# Patient Record
Sex: FEMALE | Race: WHITE | NOT HISPANIC OR LATINO | Employment: OTHER | URBAN - METROPOLITAN AREA
[De-identification: names, ages, dates, MRNs, and addresses within clinical notes are randomized per-mention and may not be internally consistent; named-entity substitution may affect disease eponyms.]

---

## 2020-09-15 ENCOUNTER — HOSPITAL ENCOUNTER (EMERGENCY)
Facility: CLINIC | Age: 60
Discharge: HOME | End: 2020-09-16
Attending: EMERGENCY MEDICINE | Admitting: EMERGENCY MEDICINE
Payer: COMMERCIAL

## 2020-09-15 DIAGNOSIS — G51.0 BELL'S PALSY: ICD-10-CM

## 2020-09-15 PROCEDURE — 85025 COMPLETE CBC W/AUTO DIFF WBC: CPT | Performed by: EMERGENCY MEDICINE

## 2020-09-15 PROCEDURE — 99283 EMERGENCY DEPT VISIT LOW MDM: CPT

## 2020-09-15 PROCEDURE — 80048 BASIC METABOLIC PNL TOTAL CA: CPT | Performed by: EMERGENCY MEDICINE

## 2020-09-15 NOTE — ED AVS SNAPSHOT
[facility] Emergency Department  [address]  [facility] [address]  Phone:  [phone]  Fax:  [phone]                                    Nina Shepard   MRN: [id number]    Department:  [facility] Emergency Department   Date of Visit:  9/15/2020           After Visit Summary Signature Page    I have received my discharge instructions, and my questions have been answered. I have discussed any challenges I see with this plan with the nurse or doctor.    ..........................................................................................................................................  Patient/Patient Representative Signature      ..........................................................................................................................................  Patient Representative Print Name and Relationship to Patient    ..................................................               ................................................  Date                                   Time    ..........................................................................................................................................  Reviewed by Signature/Title    ...................................................              ..............................................  Date                                               Time          22EPIC Rev 08/18

## 2020-09-16 VITALS
OXYGEN SATURATION: 96 % | HEART RATE: 75 BPM | RESPIRATION RATE: 16 BRPM | SYSTOLIC BLOOD PRESSURE: 120 MMHG | DIASTOLIC BLOOD PRESSURE: 70 MMHG | TEMPERATURE: 98.1 F

## 2020-09-16 LAB
ANION GAP SERPL CALCULATED.3IONS-SCNC: 9 MMOL/L (ref 3–14)
BASOPHILS # BLD AUTO: 0 10E9/L (ref 0–0.2)
BASOPHILS NFR BLD AUTO: 0.6 %
BUN SERPL-MCNC: 18 MG/DL (ref 7–30)
CALCIUM SERPL-MCNC: 8.9 MG/DL (ref 8.5–10.1)
CHLORIDE SERPL-SCNC: 108 MMOL/L (ref 94–109)
CO2 SERPL-SCNC: 20 MMOL/L (ref 20–32)
CREAT SERPL-MCNC: 0.7 MG/DL (ref 0.52–1.04)
DIFFERENTIAL METHOD BLD: ABNORMAL
EOSINOPHIL # BLD AUTO: 0.2 10E9/L (ref 0–0.7)
EOSINOPHIL NFR BLD AUTO: 3.2 %
ERYTHROCYTE [DISTWIDTH] IN BLOOD BY AUTOMATED COUNT: 12.6 % (ref 10–15)
GFR SERPL CREATININE-BSD FRML MDRD: >90 ML/MIN/{1.73_M2}
GLUCOSE SERPL-MCNC: 163 MG/DL (ref 70–99)
HCT VFR BLD AUTO: 43.7 % (ref 35–47)
HGB BLD-MCNC: 14.6 G/DL (ref 11.7–15.7)
IMM GRANULOCYTES # BLD: 0 10E9/L (ref 0–0.4)
IMM GRANULOCYTES NFR BLD: 0.3 %
LYMPHOCYTES # BLD AUTO: 1.5 10E9/L (ref 0.8–5.3)
LYMPHOCYTES NFR BLD AUTO: 24.8 %
MCH RBC QN AUTO: 31.1 PG (ref 26.5–33)
MCHC RBC AUTO-ENTMCNC: 33.4 G/DL (ref 31.5–36.5)
MCV RBC AUTO: 93 FL (ref 78–100)
MONOCYTES # BLD AUTO: 0.6 10E9/L (ref 0–1.3)
MONOCYTES NFR BLD AUTO: 9 %
NEUTROPHILS # BLD AUTO: 3.9 10E9/L (ref 1.6–8.3)
NEUTROPHILS NFR BLD AUTO: 62.1 %
NRBC # BLD AUTO: 0 10*3/UL
NRBC BLD AUTO-RTO: 0 /100
PLATELET # BLD AUTO: 143 10E9/L (ref 150–450)
POTASSIUM SERPL-SCNC: 3.9 MMOL/L (ref 3.4–5.3)
RBC # BLD AUTO: 4.69 10E12/L (ref 3.8–5.2)
SODIUM SERPL-SCNC: 137 MMOL/L (ref 133–144)
WBC # BLD AUTO: 6.2 10E9/L (ref 4–11)

## 2020-09-16 PROCEDURE — 25000131 ZZH RX MED GY IP 250 OP 636 PS 637: Performed by: EMERGENCY MEDICINE

## 2020-09-16 PROCEDURE — 25000132 ZZH RX MED GY IP 250 OP 250 PS 637: Performed by: EMERGENCY MEDICINE

## 2020-09-16 RX ORDER — VALACYCLOVIR HYDROCHLORIDE 1 G/1
1000 TABLET, FILM COATED ORAL ONCE
Status: COMPLETED | OUTPATIENT
Start: 2020-09-16 | End: 2020-09-16

## 2020-09-16 RX ORDER — PREDNISONE 20 MG/1
60 TABLET ORAL ONCE
Status: COMPLETED | OUTPATIENT
Start: 2020-09-16 | End: 2020-09-16

## 2020-09-16 RX ORDER — POLYVINYL ALCOHOL 14 MG/ML
1 SOLUTION/ DROPS OPHTHALMIC
Qty: 15 ML | Refills: 0 | Status: SHIPPED | OUTPATIENT
Start: 2020-09-16 | End: 2020-09-23

## 2020-09-16 RX ORDER — PREDNISONE 20 MG/1
60 TABLET ORAL DAILY
Qty: 18 TABLET | Refills: 0 | Status: SHIPPED | OUTPATIENT
Start: 2020-09-16 | End: 2020-09-22

## 2020-09-16 RX ORDER — VALACYCLOVIR HYDROCHLORIDE 1 G/1
1000 TABLET, FILM COATED ORAL 3 TIMES DAILY
Qty: 21 TABLET | Refills: 0 | Status: SHIPPED | OUTPATIENT
Start: 2020-09-16 | End: 2020-09-23

## 2020-09-16 RX ADMIN — VALACYCLOVIR HYDROCHLORIDE 1000 MG: 1 TABLET, FILM COATED ORAL at 00:26

## 2020-09-16 RX ADMIN — PREDNISONE 60 MG: 20 TABLET ORAL at 00:26

## 2020-09-16 ASSESSMENT — ENCOUNTER SYMPTOMS
HEADACHES: 0
ABDOMINAL PAIN: 0
CONFUSION: 0
FEVER: 0
SHORTNESS OF BREATH: 0
FACIAL ASYMMETRY: 1
COUGH: 0
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
## 2022-06-30 DIAGNOSIS — R69 DIAGNOSIS UNKNOWN: Primary | ICD-10-CM

## 2022-08-08 NOTE — TELEPHONE ENCOUNTER
Diagnosis: CIRRHOSIS OF THE LIVER  Referred by: Self   From: [facility]   Appt date: 08/09/2022   NOTES STATUS DETAILS   OFFICE NOTE from referring provider CareEverywhere  06/28/2022: Todd Michaud     OFFICE NOTE from other specialist CareEverywhere  04/19/2022: Todd Mendoza   DISCHARGE SUMMARY from hospital N/A     DISCHARGE REPORT from the ER N/A    OPERATIVE REPORT N/A     MEDICATION LIST Internal  N/A   LABS     C. DIFFICILE  N/A    BIOPSIES/PATHOLOGY RELATED TO DIAGNOSIS N/A     DIAGNOSTIC PROCEDURES     PFC TESTING (from the Pelvic floor center includes Manometry, PDNL, EMG, etc.) N/A    COLONOSCOPY N/A     UPPER ENDOSCOPY (EGD) N/A    FLEX SIGMOIDOSCOPY N/A     ERCP N/A    IMAGING (DISC & REPORT)      CT CareEverywhere 08/04/2020: [facility] (Abd/Pelvis)   MRI N/A    XRAY N/A    ULTRASOUND  (ENDOANAL/ENDORECTAL) CareEverywhere 06/07/2022: [facility] (Abd)

## 2022-08-09 ENCOUNTER — LAB (OUTPATIENT)
Dept: LAB | Facility: CLINIC | Age: 62
End: 2022-08-09
Payer: COMMERCIAL

## 2022-08-09 ENCOUNTER — PRE VISIT (OUTPATIENT)
Dept: GASTROENTEROLOGY | Facility: CLINIC | Age: 62
End: 2022-08-09

## 2022-08-09 ENCOUNTER — OFFICE VISIT (OUTPATIENT)
Dept: GASTROENTEROLOGY | Facility: CLINIC | Age: 62
End: 2022-08-09
Payer: COMMERCIAL

## 2022-08-09 ENCOUNTER — TELEPHONE (OUTPATIENT)
Dept: GASTROENTEROLOGY | Facility: CLINIC | Age: 62
End: 2022-08-09

## 2022-08-09 VITALS
OXYGEN SATURATION: 99 % | WEIGHT: 158 LBS | HEART RATE: 69 BPM | DIASTOLIC BLOOD PRESSURE: 81 MMHG | BODY MASS INDEX: 26.29 KG/M2 | SYSTOLIC BLOOD PRESSURE: 147 MMHG

## 2022-08-09 DIAGNOSIS — K74.60 CIRRHOSIS OF LIVER WITHOUT ASCITES, UNSPECIFIED HEPATIC CIRRHOSIS TYPE (H): ICD-10-CM

## 2022-08-09 DIAGNOSIS — K74.60 CIRRHOSIS OF LIVER WITHOUT ASCITES, UNSPECIFIED HEPATIC CIRRHOSIS TYPE (H): Primary | ICD-10-CM

## 2022-08-09 DIAGNOSIS — R69 DIAGNOSIS UNKNOWN: ICD-10-CM

## 2022-08-09 DIAGNOSIS — E11.69 TYPE 2 DIABETES MELLITUS WITH OTHER SPECIFIED COMPLICATION, WITH LONG-TERM CURRENT USE OF INSULIN (H): ICD-10-CM

## 2022-08-09 DIAGNOSIS — Z79.4 TYPE 2 DIABETES MELLITUS WITH OTHER SPECIFIED COMPLICATION, WITH LONG-TERM CURRENT USE OF INSULIN (H): ICD-10-CM

## 2022-08-09 DIAGNOSIS — Z12.11 COLON CANCER SCREENING: ICD-10-CM

## 2022-08-09 DIAGNOSIS — K42.9 PERIUMBILICAL HERNIA: ICD-10-CM

## 2022-08-09 DIAGNOSIS — Z12.11 COLON CANCER SCREENING: Primary | ICD-10-CM

## 2022-08-09 PROBLEM — G51.0: Status: ACTIVE | Noted: 2020-09-25

## 2022-08-09 LAB
ERYTHROCYTE [DISTWIDTH] IN BLOOD BY AUTOMATED COUNT: 13 % (ref 10–15)
HBA1C MFR BLD: 7.4 % (ref 0–5.6)
HCT VFR BLD AUTO: 40.5 % (ref 35–47)
HGB BLD-MCNC: 13.8 G/DL (ref 11.7–15.7)
MCH RBC QN AUTO: 31.9 PG (ref 26.5–33)
MCHC RBC AUTO-ENTMCNC: 34.1 G/DL (ref 31.5–36.5)
MCV RBC AUTO: 94 FL (ref 78–100)
PLATELET # BLD AUTO: 100 10E3/UL (ref 150–450)
RBC # BLD AUTO: 4.32 10E6/UL (ref 3.8–5.2)
WBC # BLD AUTO: 4.2 10E3/UL (ref 4–11)

## 2022-08-09 PROCEDURE — 86256 FLUORESCENT ANTIBODY TITER: CPT | Mod: 90

## 2022-08-09 PROCEDURE — 86015 ACTIN ANTIBODY EACH: CPT | Mod: 90

## 2022-08-09 PROCEDURE — 83036 HEMOGLOBIN GLYCOSYLATED A1C: CPT

## 2022-08-09 PROCEDURE — 80321 ALCOHOLS BIOMARKERS 1OR 2: CPT | Mod: 90

## 2022-08-09 PROCEDURE — 36415 COLL VENOUS BLD VENIPUNCTURE: CPT

## 2022-08-09 PROCEDURE — 80053 COMPREHEN METABOLIC PANEL: CPT

## 2022-08-09 PROCEDURE — 85027 COMPLETE CBC AUTOMATED: CPT

## 2022-08-09 PROCEDURE — 99205 OFFICE O/P NEW HI 60 MIN: CPT | Performed by: INTERNAL MEDICINE

## 2022-08-09 PROCEDURE — 99000 SPECIMEN HANDLING OFFICE-LAB: CPT

## 2022-08-09 PROCEDURE — 85610 PROTHROMBIN TIME: CPT

## 2022-08-09 PROCEDURE — 86381 MITOCHONDRIAL ANTIBODY EACH: CPT

## 2022-08-09 RX ORDER — CANAGLIFLOZIN 300 MG/1
300 TABLET, FILM COATED ORAL DAILY
COMMUNITY
Start: 2021-10-13

## 2022-08-09 RX ORDER — VALSARTAN 40 MG/1
1 TABLET ORAL DAILY
COMMUNITY
Start: 2022-06-15

## 2022-08-09 RX ORDER — BISACODYL 5 MG/1
TABLET, DELAYED RELEASE ORAL
Qty: 4 TABLET | Refills: 0 | Status: SHIPPED | OUTPATIENT
Start: 2022-08-09

## 2022-08-09 RX ORDER — CARBOXYMETHYLCELLULOSE SODIUM 5 MG/ML
1 SOLUTION/ DROPS OPHTHALMIC
COMMUNITY
Start: 2020-09-25

## 2022-08-09 RX ORDER — AMMONIUM LACTATE 12 G/100G
LOTION TOPICAL
COMMUNITY

## 2022-08-09 RX ORDER — ZOLPIDEM TARTRATE 5 MG/1
5 TABLET ORAL
COMMUNITY
Start: 2022-03-23

## 2022-08-09 RX ORDER — CYCLOBENZAPRINE HCL 5 MG
5 TABLET ORAL
COMMUNITY
Start: 2021-03-01

## 2022-08-09 RX ORDER — LEVOTHYROXINE SODIUM 100 UG/1
100 TABLET ORAL
COMMUNITY
Start: 2020-11-09

## 2022-08-09 RX ORDER — ONDANSETRON 4 MG/1
4 TABLET, FILM COATED ORAL
COMMUNITY

## 2022-08-09 RX ORDER — INSULIN GLARGINE 100 [IU]/ML
70 INJECTION, SOLUTION SUBCUTANEOUS
COMMUNITY
Start: 2022-07-19

## 2022-08-09 RX ORDER — METOPROLOL SUCCINATE 25 MG/1
1 TABLET, EXTENDED RELEASE ORAL DAILY
COMMUNITY
Start: 2022-03-24

## 2022-08-09 RX ORDER — CYCLOBENZAPRINE HCL 10 MG
10 TABLET ORAL
COMMUNITY

## 2022-08-09 RX ORDER — ATORVASTATIN CALCIUM 20 MG/1
20 TABLET, FILM COATED ORAL DAILY
COMMUNITY
Start: 2022-06-14

## 2022-08-09 NOTE — TELEPHONE ENCOUNTER
Patient scheduled for colonoscopy/EGD on 8/17/22.     Covid test scheduled? No. Discuss at home test option.     Pre op exam scheduled?    Arrival time: 0830    Facility location:     Sedation type: MAC    Indication for procedure: cirrhosis of liver, screening     Anticoagulations? no     Bowel prep recommendation: Golytely d/t DM    Golytely  prep sent to [facility] [address] [facility] [address] [place] [facility].    Writer unable to see endoscopy scheduling appointment encounter. Does not appear that prep instructions were given via letter. Will clarify with endoscopy scheduling.     Patient seen Dr. Carranza today in clinic (8/9/2022). Will clarify if this can be used as pre op or if patient needs to scheduled.     Rose Robb RN

## 2022-08-09 NOTE — PROGRESS NOTES
"[facility]       Hepatology Clinic    Date of Service: 8/9/2022       Primary Care Provider: Dr. Mendoza    History of Present Illness     Ms. Shepard presents for evaluation of apparent WARREN cirrhosis.    This patient has been followed in the [facility] system, and sees Dr. Mendoza for her primary care.  She recently underwent an ultrasound to evaluate an abdominal hernia, and this showed evidence of fatty liver, hepatomegaly, and possible cirrhosis.  A subsequent liver biopsy at [facility] showed WARREN and \"nearly complete cirrhosis.\"    The patient reports no prior knowledge of liver disease.  She reports no history of alcohol use.  There is no family history of liver disease.  She has noted chronic abdominal adiposity for a number of years, but exercises regularly and eats a \"healthy\" diet, and does not show evidence of obesity elsewhere in the body.    She has type 2 diabetes and is on Canagliflozin as well as high high doses of insulin.    She has a history of breast cancer that apparently is in remission.  She also had coronary artery stents placed in about 2007.  She reports no current cardiac symptoms.    Has intermittent pain that she localizes to her right flank.  This is sometimes positional in nature.  She also has joint symptoms that have been attributed to psoriatic arthritis.  She has a history of anxiety and depression.    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      Past Medical History:  No past medical history on file.    Patient Active Problem List   Diagnosis     Bell's paralysis     Cirrhosis of liver without ascites (H)     Coronary atherosclerosis     Essential hypertension     Generalized anxiety disorder     Hypothyroidism     Major depressive disorder, recurrent episode, in partial remission (H)     Mixed hyperlipidemia     Psoriatic arthropathy (H)     Type II diabetes mellitus (H)       Surgical " History:  Past Surgical History:   Procedure Laterality Date     APPENDECTOMY       HYSTERECTOMY       MAMMOPLASTY AUGMENTATION Bilateral 12/30/2016    Procedure: BILATERAL BREAST IMPLANT EXCHANGE;  Surgeon: Ermias Barakat MD;  Location: [facility];  Service:      MASTECTOMY Bilateral        Social History:  Social History     Tobacco Use     Smoking status: Never Smoker     Smokeless tobacco: Never Used   Substance Use Topics     Alcohol use: No     Drug use: No       Family History:  No family history on file.   There is no family history of liver disease or colon cancer.    Medications:  Current Outpatient Medications   Medication     calcium carbonate-vitamin D (OYSTER SHELL CALCIUM/D) 500-200 MG-UNIT tablet     carboxymethylcellulose (REFRESH PLUS) 0.5 % SOLN ophthalmic solution     cyclobenzaprine (FLEXERIL) 5 MG tablet     insulin aspart (NOVOLOG PEN) 100 UNIT/ML pen     insulin glargine (LANTUS VIAL) 100 UNIT/ML vial     levothyroxine (SYNTHROID/LEVOTHROID) 100 MCG tablet     metoprolol succinate ER (TOPROL XL) 25 MG 24 hr tablet     polyethylene glycol-propylene glycol (SYSTANE ULTRA) 0.4-0.3 % SOLN ophthalmic solution     valsartan (DIOVAN) 40 MG tablet     zolpidem (AMBIEN) 5 MG tablet     ammonium lactate (LAC-HYDRIN) 12 % external lotion     Apremilast (OTEZLA) 10 & 20 & 30 MG TBPK     artificial tears (SOOTHE NIGHT TIME) ophthalmic ointment     atorvastatin (LIPITOR) 20 MG tablet     conjugated estrogens (PREMARIN) 0.625 MG/GM vaginal cream     cyclobenzaprine (FLEXERIL) 10 MG tablet     hypromellose (GENTEAL) 0.3 % SOLN ophthalmic solution     INVOKANA 300 MG tablet     ondansetron (ZOFRAN) 4 MG tablet     valACYclovir (VALTREX) 1000 mg tablet     No current facility-administered medications for this visit.       Review of Systems    A complete 10 point review of systems was asked and answered in the negative unless specifically commented upon in the HPI    Objective:         Vitals:     08/09/22 1306   BP: (!) 147/81   Pulse: 69   SpO2: 99%   Weight: 71.7 kg (158 lb)     Body mass index is 26.29 kg/m .     Physical Exam  Constitutional: Well-developed, well-nourished.  She initially was quite anxious.  HEENT: Normocephalic.  No scleral icterus. Moist oral mucosa.  Cardiac:  Regular rate and rhythm.  No overt murmurs  Respiratory: Clear to auscultation bilaterally.  No wheezes or rales  GI:  Abdomen soft, non-distended, non-tender. BS present.  Her liver is palpable in the right upper quadrant.  Skin:  No rash noted.  No jaundice.  No spider nevi noted.    Peripheral Vascular: No lower extremity edema.   Musculoskeletal:  ROM intact, good muscle bulk    Psychiatric: Normal mood and affect. Behavior is normal.  Neuro: No asterixis    Labs and Diagnostic tests:  Recent lab tests from [facility] were reviewed.    HCV antibody negative  Hep B surface antigen, surface antibody, and core antibody negative  HIV negative  Mitochondrial antibody positive at 108.8  Smooth muscle antibody positive at 61  ISAÍAS positive at greater than 1:640  Alpha-1 antitrypsin phenotype M1 M2  Ceruloplasmin 23  Normal iron studies  IgG 1296     US 6-7-22  IMPRESSION:   1.  Hepatosplenomegaly with coarsened hepatic echotexture suggesting chronic hepatocellular disease with possible portal hypertension. Main portal vein is patent with flow directed towards the liver.     2.  Focused ultrasound in the epigastric region at the site of patient's palpable lump appears to represent a fat-containing periumbilical hernia. No discrete or suspicious mass/fluid collection. No vascular structure to suggest periumbilical varices.      Assessment and Plan:    1.  Likely WARREN cirrhosis (compensated).  2.  Apparently increased intra-abdominal fat without overt adiposity elsewhere.  3.  Diabetes with high insulin requirements.  4.  Positive AMA and smooth muscle antibody.  5. Umbilical hernia.  6.  Psoriatic arthritis.    The biopsy findings  suggest that WARREN is the etiology of her advanced liver disease.  However, she presents an interesting phenotype with apparently increased intra-abdominal fat and apparent insulin resistance.  This might represent lipodystrophy or other distinct disorder.     I think that she has had an appropriate work-up for other forms of liver disease. I would like to get Dr. Rosenthal to consult on the liver biopsy because of the serologic markers (ISAÍAS, SMA, AMA).    I am also recommending that we arrange for an Endocrinology consultation to consider whether any other conditions (e.g. a lipodystrophy syndrome) might be contributing to hear WARREN and apparent insulin resistance.     I have ordered an EGD to screen for varices. I have also ordered a CT scan to better evaluate her right flank pain and portal HTN.    Given her cirrhosis, I think it is prudent to have her umbilical hernia repaired, since this would present a problem if she develops ascites. We will initiate a surgery consult.    She wondered whether she can restart Apremilast for her psoriatic arthritis. I see no hepatic contraindication to this.        Follow Up:  6 months, or sooner depending on her results.    About 70 minutes spent today with patient, reviewing results, and coordinating care.    Addendum 8-10-22: I have communicated with Ms. Huerta - the patient will be seen in the weight management clinic specializing in fatty liver disease, and will coordinate endocrinology and surgery consultations.    Rogerio Carranza MD  Professor of Medicine  [facility]  Division of Gastroenterology, Hepatology, and Nutrition

## 2022-08-09 NOTE — PATIENT INSTRUCTIONS
It was a pleasure taking care of you today. I've included a brief summary of our discussion and care plan from today's visit below.  Please review this information with your primary care provider.  _______________________________________________________________________    My recommendations are summarized as follows:  Blood tests today.  Abdominal CT scan at the [facility] - you can call the number below to schedule.  Endoscopy and Colonoscopy as discussed.  We will arrange for a consultation in the weight management - fatty liver clinic at the [facility].  We will arrange to review your liver biopsy.    Return to GI Clinic in about 6 months to review your progress.     If you need any follow-up appointments, please use the following phone numbers below.    To schedule or reschedule a follow-up GI appointment, call [phone] option 1    To schedule your endoscopy procedure, call [phone] option 2    To schedule imaging, please call [phone]     To schedule your lab appointment at [address] floor lab call [phone]. Call your [place] lab directly if it is not [facility]. If you use a non-[place] lab, please let us know where to fax your lab order (call Gwen at [phone]).      _______________________________________________________________________    Please be in touch if there are any further questions that arise following today's visit.  There are multiple ways to contact your gastroenterology care team.      During business hours, you may reach your gastroenterology RN Care Coordinator, Gwen Palacios, at [phone].      You can always send a secure message through [employer]. [employer] messages are answered by your nurse or doctor typically within 24 hours. Please allow extra time on weekends and holidays.     What is [employer]?  [employer] is a secure way for you to access all of your healthcare records from the [facility].  It is a web based computer program, so you  can sign on to it from any location.  It also allows you to send secure messages to your care team.  I recommend signing up for [employer] access if you have not already done so and are comfortable with using a computer.     For urgent/emergent questions after business hours, you may reach the on-call GI Fellow by contacting the [facility]  at [phone].     How will I get the results of any tests ordered?    You will receive all of your results.  If you have signed up for [employer], any tests ordered at your visit will be available to you after your physician reviews them.  Typically this takes 1-2 weeks.  If there are urgent results that require a change in your care plan, your physician or nurse will call you to discuss the next steps.      Thank you for choosing [facility] G[facility]ology [facility]!       Sincerely,    Rogerio Carranza MD  Professor of Medicine  [facility]  Division of Gastroenterology, Hepatology, and Nutrition

## 2022-08-09 NOTE — LETTER
"    8/9/2022         RE: Nina Shepard  [address] [place] [address]        Dear Colleague,    Thank you for referring your patient, Nina Shepard, to the [facility] [place]. Please see a copy of my visit note below.    [facility]       Hepatology Clinic    Date of Service: 8/9/2022       Primary Care Provider: Dr. Mendoza    History of Present Illness     Ms. Shepard presents for evaluation of apparent WARREN cirrhosis.    This patient has been followed in the [facility] system, and sees Dr. Mendoza for her primary care.  She recently underwent an ultrasound to evaluate an abdominal hernia, and this showed evidence of fatty liver, hepatomegaly, and possible cirrhosis.  A subsequent liver biopsy at [facility] showed WARREN and \"nearly complete cirrhosis.\"    The patient reports no prior knowledge of liver disease.  She reports no history of alcohol use.  There is no family history of liver disease.  She has noted chronic abdominal adiposity for a number of years, but exercises regularly and eats a \"healthy\" diet, and does not show evidence of obesity elsewhere in the body.    She has type 2 diabetes and is on Canagliflozin as well as high high doses of insulin.    She has a history of breast cancer that apparently is in remission.  She also had coronary artery stents placed in about 2007.  She reports no current cardiac symptoms.    Has intermittent pain that she localizes to her right flank.  This is sometimes positional in nature.  She also has joint symptoms that have been attributed to psoriatic arthritis.  She has a history of anxiety and depression.    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      Past Medical History:  No past medical history on file.    Patient Active Problem List   Diagnosis     Bell's paralysis     Cirrhosis of liver without ascites (H)     Coronary atherosclerosis     " Essential hypertension     Generalized anxiety disorder     Hypothyroidism     Major depressive disorder, recurrent episode, in partial remission (H)     Mixed hyperlipidemia     Psoriatic arthropathy (H)     Type II diabetes mellitus (H)       Surgical History:  Past Surgical History:   Procedure Laterality Date     APPENDECTOMY       HYSTERECTOMY       MAMMOPLASTY AUGMENTATION Bilateral 12/30/2016    Procedure: BILATERAL BREAST IMPLANT EXCHANGE;  Surgeon: Ermias Barakat MD;  Location: [facility] OR;  Service:      MASTECTOMY Bilateral        Social History:  Social History     Tobacco Use     Smoking status: Never Smoker     Smokeless tobacco: Never Used   Substance Use Topics     Alcohol use: No     Drug use: No       Family History:  No family history on file.   There is no family history of liver disease or colon cancer.    Medications:  Current Outpatient Medications   Medication     calcium carbonate-vitamin D (OYSTER SHELL CALCIUM/D) 500-200 MG-UNIT tablet     carboxymethylcellulose (REFRESH PLUS) 0.5 % SOLN ophthalmic solution     cyclobenzaprine (FLEXERIL) 5 MG tablet     insulin aspart (NOVOLOG PEN) 100 UNIT/ML pen     insulin glargine (LANTUS VIAL) 100 UNIT/ML vial     levothyroxine (SYNTHROID/LEVOTHROID) 100 MCG tablet     metoprolol succinate ER (TOPROL XL) 25 MG 24 hr tablet     polyethylene glycol-propylene glycol (SYSTANE ULTRA) 0.4-0.3 % SOLN ophthalmic solution     valsartan (DIOVAN) 40 MG tablet     zolpidem (AMBIEN) 5 MG tablet     ammonium lactate (LAC-HYDRIN) 12 % external lotion     Apremilast (OTEZLA) 10 & 20 & 30 MG TBPK     artificial tears (SOOTHE NIGHT TIME) ophthalmic ointment     atorvastatin (LIPITOR) 20 MG tablet     conjugated estrogens (PREMARIN) 0.625 MG/GM vaginal cream     cyclobenzaprine (FLEXERIL) 10 MG tablet     hypromellose (GENTEAL) 0.3 % SOLN ophthalmic solution     INVOKANA 300 MG tablet     ondansetron (ZOFRAN) 4 MG tablet     valACYclovir (VALTREX) 1000 mg tablet      No current facility-administered medications for this visit.       Review of Systems    A complete 10 point review of systems was asked and answered in the negative unless specifically commented upon in the HPI    Objective:         Vitals:    08/09/22 1306   BP: (!) 147/81   Pulse: 69   SpO2: 99%   Weight: 71.7 kg (158 lb)     Body mass index is 26.29 kg/m .     Physical Exam  Constitutional: Well-developed, well-nourished.  She initially was quite anxious.  HEENT: Normocephalic.  No scleral icterus. Moist oral mucosa.  Cardiac:  Regular rate and rhythm.  No overt murmurs  Respiratory: Clear to auscultation bilaterally.  No wheezes or rales  GI:  Abdomen soft, non-distended, non-tender. BS present.  Her liver is palpable in the right upper quadrant.  Skin:  No rash noted.  No jaundice.  No spider nevi noted.    Peripheral Vascular: No lower extremity edema.   Musculoskeletal:  ROM intact, good muscle bulk    Psychiatric: Normal mood and affect. Behavior is normal.  Neuro: No asterixis    Labs and Diagnostic tests:  Recent lab tests from [facility] were reviewed.    HCV antibody negative  Hep B surface antigen, surface antibody, and core antibody negative  HIV negative  Mitochondrial antibody positive at 108.8  Smooth muscle antibody positive at 61  ISAÍAS positive at greater than 1:640  Alpha-1 antitrypsin phenotype M1 M2  Ceruloplasmin 23  Normal iron studies  IgG 1296     US 6-7-22  IMPRESSION:   1.  Hepatosplenomegaly with coarsened hepatic echotexture suggesting chronic hepatocellular disease with possible portal hypertension. Main portal vein is patent with flow directed towards the liver.     2.  Focused ultrasound in the epigastric region at the site of patient's palpable lump appears to represent a fat-containing periumbilical hernia. No discrete or suspicious mass/fluid collection. No vascular structure to suggest periumbilical varices.      Assessment and Plan:    1.  Likely WARREN cirrhosis  (compensated).  2.  Apparently increased intra-abdominal fat without overt adiposity elsewhere.  3.  Diabetes with high insulin requirements.  4.  Positive AMA and smooth muscle antibody.  5. Umbilical hernia.  6.  Psoriatic arthritis.    The biopsy findings suggest that WARREN is the etiology of her advanced liver disease.  However, she presents an interesting phenotype with apparently increased intra-abdominal fat and apparent insulin resistance.  This might represent lipodystrophy or other distinct disorder.     I think that she has had an appropriate work-up for other forms of liver disease. I would like to get Dr. Rosenthal to consult on the liver biopsy because of the serologic markers (ISAÍAS, SMA, AMA).    I am also recommending that we arrange for an Endocrinology consultation to consider whether any other conditions (e.g. a lipodystrophy syndrome) might be contributing to hear WARREN and apparent insulin resistance.     I have ordered an EGD to screen for varices. I have also ordered a CT scan to better evaluate her right flank pain and portal HTN.    Given her cirrhosis, I think it is prudent to have her umbilical hernia repaired, since this would present a problem if she develops ascites. We will initiate a surgery consult.    She wondered whether she can restart Apremilast for her psoriatic arthritis. I see no hepatic contraindication to this.        Follow Up:  6 months, or sooner depending on her results.    About 70 minutes spent today with patient, reviewing results, and coordinating care.        Rogerio Carranza MD  Professor of Medicine  [facility]  Division of Gastroenterology, Hepatology, and Nutrition      Again, thank you for allowing me to participate in the care of your patient.        Sincerely,        Rogerio Carranza MD

## 2022-08-10 LAB
ALBUMIN SERPL-MCNC: 4.1 G/DL (ref 3.4–5)
ALP SERPL-CCNC: 70 U/L (ref 40–150)
ALT SERPL W P-5'-P-CCNC: 59 U/L (ref 0–50)
ANION GAP SERPL CALCULATED.3IONS-SCNC: 10 MMOL/L (ref 3–14)
AST SERPL W P-5'-P-CCNC: 33 U/L (ref 0–45)
BILIRUB SERPL-MCNC: 1 MG/DL (ref 0.2–1.3)
BUN SERPL-MCNC: 15 MG/DL (ref 7–30)
CALCIUM SERPL-MCNC: 9.2 MG/DL (ref 8.5–10.1)
CHLORIDE BLD-SCNC: 106 MMOL/L (ref 94–109)
CO2 SERPL-SCNC: 22 MMOL/L (ref 20–32)
CREAT SERPL-MCNC: 0.7 MG/DL (ref 0.52–1.04)
GFR SERPL CREATININE-BSD FRML MDRD: >90 ML/MIN/1.73M2
GLUCOSE BLD-MCNC: 279 MG/DL (ref 70–99)
INR PPP: 1.15 (ref 0.85–1.15)
POTASSIUM BLD-SCNC: 4.3 MMOL/L (ref 3.4–5.3)
PROT SERPL-MCNC: 7.4 G/DL (ref 6.8–8.8)
SODIUM SERPL-SCNC: 138 MMOL/L (ref 133–144)

## 2022-08-10 NOTE — TELEPHONE ENCOUNTER
Staff message from gastro provider regarding unable to send to endoscopy pool to schedule so patient was scheduled by [facility] florian.     Golytely prep instructions sent via "[employer]". Seeking clarification from provider to see if OV can be used for pre op.    Rose Robb RN

## 2022-08-10 NOTE — TELEPHONE ENCOUNTER
Attempted to contact patient for pre assessment questions. No answer.     Left message to return call to [phone] #3    Pre op exam- OV 8/9/22 with Dr. Carranza.     Rose Robb RN

## 2022-08-11 NOTE — TELEPHONE ENCOUNTER
Patient returned call.     Pre assessment questions completed for upcoming colonoscopy/EGD procedure scheduled on 8/17/22    COVID policy reviewed. Patient to complete rapid antigen test one to two days before their scheduled procedure. Patient to bring photo of the results when they come in for their procedure.    Pre op exam- OV 8/9/22    Reviewed arrival time 0830 and facility location [facility]     policy reviewed. Patient to have someone stay with them 24 hours post procedure.     Reviewed Golytely prep instructions with patient. No fiber/iron supplements or foods that contain nuts/seeds 7 days prior to procedure.     Patient verbalized understanding and had no questions or concerns at this time.    Rose Robb RN

## 2022-08-12 LAB
MITOCHONDRIA M2 IGG SER-ACNC: 43 U/ML
SMA IGG SER-ACNC: 73 UNITS

## 2022-08-13 LAB
PLPETH BLD-MCNC: <10 NG/ML
POPETH BLD-MCNC: <10 NG/ML
SMOOTH MUSCLE IGG TITR SER: ABNORMAL {TITER}

## 2022-08-14 ENCOUNTER — HEALTH MAINTENANCE LETTER (OUTPATIENT)
Age: 62
End: 2022-08-14

## 2022-08-17 ENCOUNTER — HOSPITAL ENCOUNTER (OUTPATIENT)
Facility: CLINIC | Age: 62
Discharge: HOME | End: 2022-08-17
Attending: INTERNAL MEDICINE | Admitting: INTERNAL MEDICINE
Payer: COMMERCIAL

## 2022-08-17 ENCOUNTER — ANESTHESIA EVENT (OUTPATIENT)
Dept: GASTROENTEROLOGY | Facility: CLINIC | Age: 62
End: 2022-08-17
Payer: COMMERCIAL

## 2022-08-17 ENCOUNTER — LAB REQUISITION (OUTPATIENT)
Dept: LAB | Facility: CLINIC | Age: 62
End: 2022-08-17
Payer: COMMERCIAL

## 2022-08-17 ENCOUNTER — ANESTHESIA (OUTPATIENT)
Dept: GASTROENTEROLOGY | Facility: CLINIC | Age: 62
End: 2022-08-17
Payer: COMMERCIAL

## 2022-08-17 VITALS
BODY MASS INDEX: 26.29 KG/M2 | DIASTOLIC BLOOD PRESSURE: 78 MMHG | SYSTOLIC BLOOD PRESSURE: 145 MMHG | OXYGEN SATURATION: 96 % | HEIGHT: 65 IN | RESPIRATION RATE: 19 BRPM | HEART RATE: 63 BPM

## 2022-08-17 LAB
COLONOSCOPY: NORMAL
GLUCOSE BLDC GLUCOMTR-MCNC: 199 MG/DL (ref 70–99)
PATH REPORT.COMMENTS IMP SPEC: NORMAL
PATH REPORT.FINAL DX SPEC: NORMAL
PATH REPORT.GROSS SPEC: NORMAL
PATH REPORT.MICROSCOPIC SPEC OTHER STN: NORMAL
PATH REPORT.RELEVANT HX SPEC: NORMAL
PATH REPORT.RELEVANT HX SPEC: NORMAL
PATH REPORT.SITE OF ORIGIN SPEC: NORMAL
UPPER GI ENDOSCOPY: NORMAL

## 2022-08-17 PROCEDURE — 88321 CONSLTJ&REPRT SLD PREP ELSWR: CPT | Performed by: PATHOLOGY

## 2022-08-17 PROCEDURE — 370N000017 HC ANESTHESIA TECHNICAL FEE, PER MIN: Performed by: INTERNAL MEDICINE

## 2022-08-17 PROCEDURE — 258N000003 HC RX IP 258 OP 636: Performed by: NURSE ANESTHETIST, CERTIFIED REGISTERED

## 2022-08-17 PROCEDURE — 82962 GLUCOSE BLOOD TEST: CPT

## 2022-08-17 PROCEDURE — 250N000009 HC RX 250: Performed by: NURSE ANESTHETIST, CERTIFIED REGISTERED

## 2022-08-17 PROCEDURE — 250N000011 HC RX IP 250 OP 636: Performed by: NURSE ANESTHETIST, CERTIFIED REGISTERED

## 2022-08-17 PROCEDURE — 43235 EGD DIAGNOSTIC BRUSH WASH: CPT | Performed by: INTERNAL MEDICINE

## 2022-08-17 PROCEDURE — 999N000010 HC STATISTIC ANES STAT CODE-CRNA PER MINUTE: Performed by: INTERNAL MEDICINE

## 2022-08-17 PROCEDURE — G0121 COLON CA SCRN NOT HI RSK IND: HCPCS | Performed by: INTERNAL MEDICINE

## 2022-08-17 PROCEDURE — 43239 EGD BIOPSY SINGLE/MULTIPLE: CPT | Performed by: INTERNAL MEDICINE

## 2022-08-17 PROCEDURE — 45378 DIAGNOSTIC COLONOSCOPY: CPT | Performed by: INTERNAL MEDICINE

## 2022-08-17 RX ORDER — DEXMEDETOMIDINE HYDROCHLORIDE 4 UG/ML
INJECTION, SOLUTION INTRAVENOUS PRN
Status: DISCONTINUED | OUTPATIENT
Start: 2022-08-17 | End: 2022-08-17

## 2022-08-17 RX ORDER — LIDOCAINE HYDROCHLORIDE 20 MG/ML
INJECTION, SOLUTION INFILTRATION; PERINEURAL PRN
Status: DISCONTINUED | OUTPATIENT
Start: 2022-08-17 | End: 2022-08-17

## 2022-08-17 RX ORDER — SODIUM CHLORIDE, SODIUM LACTATE, POTASSIUM CHLORIDE, CALCIUM CHLORIDE 600; 310; 30; 20 MG/100ML; MG/100ML; MG/100ML; MG/100ML
INJECTION, SOLUTION INTRAVENOUS CONTINUOUS PRN
Status: DISCONTINUED | OUTPATIENT
Start: 2022-08-17 | End: 2022-08-17

## 2022-08-17 RX ORDER — LORAZEPAM 1 MG/1
1 TABLET ORAL EVERY 6 HOURS PRN
COMMUNITY

## 2022-08-17 RX ORDER — ONDANSETRON 2 MG/ML
INJECTION INTRAMUSCULAR; INTRAVENOUS PRN
Status: DISCONTINUED | OUTPATIENT
Start: 2022-08-17 | End: 2022-08-17

## 2022-08-17 RX ORDER — PROPOFOL 10 MG/ML
INJECTION, EMULSION INTRAVENOUS CONTINUOUS PRN
Status: DISCONTINUED | OUTPATIENT
Start: 2022-08-17 | End: 2022-08-17

## 2022-08-17 RX ADMIN — PROPOFOL 150 MCG/KG/MIN: 10 INJECTION, EMULSION INTRAVENOUS at 09:39

## 2022-08-17 RX ADMIN — ONDANSETRON 4 MG: 2 INJECTION INTRAMUSCULAR; INTRAVENOUS at 09:48

## 2022-08-17 RX ADMIN — DEXMEDETOMIDINE HYDROCHLORIDE 12 MCG: 100 INJECTION, SOLUTION INTRAVENOUS at 09:38

## 2022-08-17 RX ADMIN — SODIUM CHLORIDE, POTASSIUM CHLORIDE, SODIUM LACTATE AND CALCIUM CHLORIDE: 600; 310; 30; 20 INJECTION, SOLUTION INTRAVENOUS at 09:38

## 2022-08-17 RX ADMIN — PROPOFOL 30 MG: 10 INJECTION, EMULSION INTRAVENOUS at 09:42

## 2022-08-17 RX ADMIN — LIDOCAINE HYDROCHLORIDE 100 MG: 20 INJECTION, SOLUTION INFILTRATION; PERINEURAL at 09:46

## 2022-08-17 RX ADMIN — DEXMEDETOMIDINE HYDROCHLORIDE 8 MCG: 100 INJECTION, SOLUTION INTRAVENOUS at 09:43

## 2022-08-17 ASSESSMENT — ACTIVITIES OF DAILY LIVING (ADL)
ADLS_ACUITY_SCORE: 35
ADLS_ACUITY_SCORE: 35

## 2022-08-17 ASSESSMENT — COPD QUESTIONNAIRES: COPD: 0

## 2022-08-17 NOTE — ANESTHESIA PREPROCEDURE EVALUATION
Anesthesia Pre-Procedure Evaluation    Patient: Nina Shepard   MRN: [id number] : 1960        Procedure : Procedure(s):  ESOPHAGOGASTRODUODENOSCOPY (EGD)  COLONOSCOPY          No past medical history on file.   Past Surgical History:   Procedure Laterality Date     APPENDECTOMY       HYSTERECTOMY       MAMMOPLASTY AUGMENTATION Bilateral 2016    Procedure: BILATERAL BREAST IMPLANT EXCHANGE;  Surgeon: Ermias Barakat MD;  Location: [facility] OR;  Service:      MASTECTOMY Bilateral       Allergies   Allergen Reactions     Codeine GI Disturbance     Other reaction(s): Gastrointestinal      Social History     Tobacco Use     Smoking status: Never Smoker     Smokeless tobacco: Never Used   Substance Use Topics     Alcohol use: No      Wt Readings from Last 1 Encounters:   22 71.7 kg (158 lb)        Anesthesia Evaluation   Pt has had prior anesthetic.     History of anesthetic complications  - PONV.      ROS/MED HX  ENT/Pulmonary:    (-) asthma, COPD and sleep apnea   Neurologic:    (-) no CVA and no TIA   Cardiovascular:     (+) Dyslipidemia hypertension--CAD --stent-    METS/Exercise Tolerance:     Hematologic:       Musculoskeletal:       GI/Hepatic:     (+) liver disease (WARREN),  (-) GERD   Renal/Genitourinary:    (-) renal disease   Endo:     (+) type II DM, thyroid problem, hypothyroidism,     Psychiatric/Substance Use:     (+) psychiatric history anxiety and depression     Infectious Disease:       Malignancy:       Other:            Physical Exam    Airway        Mallampati: II   TM distance: > 3 FB   Neck ROM: full     Respiratory Devices and Support         Dental  no notable dental history         Cardiovascular   cardiovascular exam normal          Pulmonary           breath sounds clear to auscultation           OUTSIDE LABS:  CBC:   Lab Results   Component Value Date    WBC 4.2 2022    WBC 6.2 09/15/2020    HGB 13.8 2022    HGB 14.6 09/15/2020    HCT 40.5 2022     HCT 43.7 09/15/2020     (L) 08/09/2022     (L) 09/15/2020     BMP:   Lab Results   Component Value Date     08/09/2022     09/15/2020    POTASSIUM 4.3 08/09/2022    POTASSIUM 3.9 09/15/2020    CHLORIDE 106 08/09/2022    CHLORIDE 108 09/15/2020    CO2 22 08/09/2022    CO2 20 09/15/2020    BUN 15 08/09/2022    BUN 18 09/15/2020    CR 0.70 08/09/2022    CR 0.70 09/15/2020     (H) 08/09/2022     (H) 09/15/2020     COAGS:   Lab Results   Component Value Date    PTT 28 11/25/2008    INR 1.15 08/09/2022     POC:   Lab Results   Component Value Date     (H) 05/08/2009     HEPATIC:   Lab Results   Component Value Date    ALBUMIN 4.1 08/09/2022    PROTTOTAL 7.4 08/09/2022    ALT 59 (H) 08/09/2022    AST 33 08/09/2022    ALKPHOS 70 08/09/2022    BILITOTAL 1.0 08/09/2022     OTHER:   Lab Results   Component Value Date    A1C 7.4 (H) 08/09/2022    ABDON 9.2 08/09/2022       Anesthesia Plan    ASA Status:  3      Anesthesia Type: MAC.              Consents    Anesthesia Plan(s) and associated risks, benefits, and realistic alternatives discussed. Questions answered and patient/representative(s) expressed understanding.    - Discussed:     - Discussed with:  Patient         Postoperative Care       PONV prophylaxis: Ondansetron (or other 5HT-3)     Comments:                Shirin Julio

## 2022-08-17 NOTE — ANESTHESIA CARE TRANSFER NOTE
Patient: Nina Shepard    Procedure: Procedure(s):  ESOPHAGOGASTRODUODENOSCOPY (EGD)  COLONOSCOPY       Diagnosis: Varices, gastric [I86.4]  Encounter for screening colonoscopy [Z12.11]  Diagnosis Additional Information: No value filed.    Anesthesia Type:   MAC     Note:    Oropharynx: oropharynx clear of all foreign objects and spontaneously breathing  Level of Consciousness: drowsy  Oxygen Supplementation: room air    Independent Airway: airway patency satisfactory and stable  Dentition: dentition unchanged  Vital Signs Stable: post-procedure vital signs reviewed and stable  Report to RN Given: handoff report given  Patient transferred to: PACU    Handoff Report: Identifed the Patient, Identified the Reponsible Provider, Reviewed the pertinent medical history, Discussed the surgical course, Reviewed Intra-OP anesthesia mangement and issues during anesthesia, Set expectations for post-procedure period and Allowed opportunity for questions and acknowledgement of understanding      Vitals:  Vitals Value Taken Time   /74 08/17/22 1037   Temp     Pulse 68 08/17/22 1038   Resp 19 08/17/22 1038   SpO2 95 % 08/17/22 1038   Vitals shown include unvalidated device data.    Electronically Signed By: BERNICE Montanez CRNA  August 17, 2022  10:39 AM

## 2022-08-17 NOTE — ANESTHESIA POSTPROCEDURE EVALUATION
Patient: Nina Shepard    Procedure: Procedure(s):  ESOPHAGOGASTRODUODENOSCOPY (EGD)  COLONOSCOPY       Anesthesia Type:  MAC    Note:  Disposition: Outpatient   Postop Pain Control: Uneventful            Sign Out: Well controlled pain   PONV: No   Neuro/Psych: Uneventful            Sign Out: Acceptable/Baseline neuro status   Airway/Respiratory: Uneventful            Sign Out: Acceptable/Baseline resp. status   CV/Hemodynamics: Uneventful            Sign Out: Acceptable CV status   Other NRE:    DID A NON-ROUTINE EVENT OCCUR? No           Last vitals:  Vitals Value Taken Time   /78 08/17/22 1110   Temp     Pulse 59 08/17/22 1117   Resp 17 08/17/22 1117   SpO2 97 % 08/17/22 1114   Vitals shown include unvalidated device data.    Electronically Signed By: Shirin Julio  August 17, 2022  1:33 PM

## 2022-08-17 NOTE — H&P
ENDOSCOPY PRE-SEDATION H&P FOR OUTPATIENT PROCEDURES    Nina Shepard  [id number]  1960    Procedure: Colonoscopy Endoscopy    Pre-procedure diagnosis: Cirrhosis, screening    Past medical history:   Past Medical History:   Diagnosis Date     Bell's palsy      Cancer (H)      Diabetes (H)      Heart disease      Hypertension      WARREN (nonalcoholic steatohepatitis)      PONV (postoperative nausea and vomiting)      Thyroid disease      Patient Active Problem List   Diagnosis     Bell's paralysis     Cirrhosis of liver without ascites (H)     Coronary atherosclerosis     Essential hypertension     Generalized anxiety disorder     Hypothyroidism     Major depressive disorder, recurrent episode, in partial remission (H)     Mixed hyperlipidemia     Psoriatic arthropathy (H)     Type II diabetes mellitus (H)       Past surgical history:   Past Surgical History:   Procedure Laterality Date     ABDOMINAL ADHESION SURGERY       APPENDECTOMY       CARDIAC SURGERY        SECTION       ENT SURGERY       HYSTERECTOMY       MAMMOPLASTY AUGMENTATION Bilateral 2016    Procedure: BILATERAL BREAST IMPLANT EXCHANGE;  Surgeon: Ermias Barakat MD;  Location: [facility];  Service:      MASTECTOMY Bilateral      TONSILLECTOMY         No current facility-administered medications for this encounter.       Allergies   Allergen Reactions     Codeine GI Disturbance     Other reaction(s): Gastrointestinal       History of Anesthesia/Sedation Problems: no    Physical Exam:    Mental status: alert  Heart: Normal  Lung: Normal  Assessment of patient's airway: Normal  Other as pertinent for procedure: None     Lab Results   Component Value Date    WBC 4.2 2022    WBC 6.2 09/15/2020     Lab Results   Component Value Date    RBC 4.32 2022    RBC 4.69 09/15/2020     Lab Results   Component Value Date    HGB 13.8 2022    HGB 14.6 09/15/2020     Lab Results   Component Value Date    HCT 40.5 2022     HCT 43.7 09/15/2020     Lab Results   Component Value Date    MCV 94 08/09/2022    MCV 93 09/15/2020     Lab Results   Component Value Date    MCH 31.9 08/09/2022    MCH 31.1 09/15/2020     Lab Results   Component Value Date    MCHC 34.1 08/09/2022    MCHC 33.4 09/15/2020     Lab Results   Component Value Date    RDW 13.0 08/09/2022    RDW 12.6 09/15/2020     Lab Results   Component Value Date     08/09/2022     09/15/2020     INR   Date Value Ref Range Status   08/09/2022 1.15 0.85 - 1.15 Final   11/25/2008 1.05 0.86 - 1.14 Final        ASA Score: See Provation note    Mallampati score:  II - Faucial pillars and soft palate may be seen, but uvula is masked by the base of the tongue    Assessment/Plan:     See my clinic note from 8-9-22.    The patient is an appropriate candidate to receive sedation.    Informed consent was discussed with the patient/family, including the risks, benefits, potential complications and any alternative options associated with sedation.    Patient assessment completed just prior to sedation and while under constant observation by the provider. Condition determined to be adequate for proceeding with sedation.    The specific risks for the procedure were discussed with the patient at the time of informed consent and include but are not limited to perforation which could require surgery, missing significant neoplasm or lesion, hemorrhage and adverse sedative complication.      Rogerio Carranza MD

## 2022-08-21 ENCOUNTER — DOCUMENTATION ONLY (OUTPATIENT)
Dept: GASTROENTEROLOGY | Facility: CLINIC | Age: 62
End: 2022-08-21

## 2022-08-21 NOTE — PROGRESS NOTES
Hepatology Staff    I asked for a pathology consultation from Dr. Rosenthal regarding her prior liver biopsy from .    She has positive SMA, ISAÍAS, and AMA on serologies at .    Dr. Rosenthal's opinion is that the biopsy shows steatohepatitis with advanced fibrosis or cirrhosis. He did not see evidence of AIH or PBC.

## 2022-08-22 NOTE — TELEPHONE ENCOUNTER
REFERRAL INFORMATION:    Referring Provider:      Referring Clinic:      Reason for Visit/Diagnosis: New hernia        FUTURE VISIT INFORMATION:    Appointment Date: 11/9/2022    Appointment Time: 10:30 AM     NOTES RECORD STATUS  DETAILS   OFFICE NOTE from Referring Provider N/A    OFFICE NOTE from Other Specialists Internal 8/9/2022 Office visit with Dr. Rogerio Carranza ( [facility] [place])      [facility] DISCHARGE SUMMARY/ ED VISITS  N/A    OPERATIVE REPORT N/A    ENDOSCOPY (EGD)  Internal 8/17/2022   PERTINENT LABS Internal    PATHOLOGY REPORTS (RELATED) Internal 8/17/2022   IMAGING (CT, MRI, US, XR)  N/A

## 2022-08-24 ENCOUNTER — ANCILLARY PROCEDURE (OUTPATIENT)
Dept: CT IMAGING | Facility: CLINIC | Age: 62
End: 2022-08-24
Attending: INTERNAL MEDICINE
Payer: COMMERCIAL

## 2022-08-24 DIAGNOSIS — K42.9 PERIUMBILICAL HERNIA: ICD-10-CM

## 2022-08-24 DIAGNOSIS — K74.60 CIRRHOSIS OF LIVER WITHOUT ASCITES, UNSPECIFIED HEPATIC CIRRHOSIS TYPE (H): ICD-10-CM

## 2022-08-24 PROCEDURE — 74177 CT ABD & PELVIS W/CONTRAST: CPT | Mod: GC | Performed by: RADIOLOGY

## 2022-08-24 RX ORDER — IOPAMIDOL 755 MG/ML
86 INJECTION, SOLUTION INTRAVASCULAR ONCE
Status: COMPLETED | OUTPATIENT
Start: 2022-08-24 | End: 2022-08-24

## 2022-08-24 RX ADMIN — IOPAMIDOL 86 ML: 755 INJECTION, SOLUTION INTRAVASCULAR at 14:44

## 2022-08-26 ENCOUNTER — TELEPHONE (OUTPATIENT)
Dept: GASTROENTEROLOGY | Facility: CLINIC | Age: 62
End: 2022-08-26

## 2022-08-26 NOTE — TELEPHONE ENCOUNTER
[facility]    Phone Message    May a detailed message be left on voicemail: yes     Reason for Call: Requesting Results   Name/type of test: CT ABDOMEN WWO, Colonoscopy, labs  Date of test: 08/24/2022  Was test done at a location other than [facility] (Please fill in the location if not [facility])?: No. Patient expressed they saw some results on MyChart and that it looks scary to them and that  has not commented on the results either. She would like a call back to go over the results and what the plan is now moving forward, thank you!      Action Taken: Message routed to:  [facility]    Travel Screening: Not Applicable

## 2022-08-26 NOTE — RESULT ENCOUNTER NOTE
Ms. Shepard:    Your abdominal CT scan showed an enlarged fatty liver and evidence of cirrhosis, as expected.     Our recommendation are not changed - I suggest that you meet with the team in the weight management - metabolism clinic specializing in fatty liver disease. In addition, Dr. English will be seeing you regarding your hernia.    If you have further questions, you can contact the Gastroenterology Nurse Coordinator, Gwen Palacios at [phone]. To schedule a clinic appointment, you can call [phone] option 1.    - Dr. Carranza

## 2022-08-29 NOTE — TELEPHONE ENCOUNTER
Called Nina back. She had since read Dr. Carranza's recommendations and did not have further questions. She reports she has been walking 4 miles 5 days per week and eliminated added salt and is eating low sodium diet, as well. She will continue to do this and meet up with Dr. Carranza, Dr. English, and weight management clinic.

## 2022-09-01 ENCOUNTER — TELEPHONE (OUTPATIENT)
Dept: ENDOCRINOLOGY | Facility: CLINIC | Age: 62
End: 2022-09-01

## 2022-09-01 NOTE — TELEPHONE ENCOUNTER
9/1 left  for staff msg    Dr Walden would prefer to see her in person..    Per Dr. Walden, ok to double book this patient on his September 12 in person clinic day around 10am with a return pt.  Gerard

## 2022-10-10 ENCOUNTER — NURSE TRIAGE (OUTPATIENT)
Dept: CALL CENTER | Age: 62
End: 2022-10-10

## 2022-10-10 ENCOUNTER — PRE VISIT (OUTPATIENT)
Dept: ENDOCRINOLOGY | Facility: CLINIC | Age: 62
End: 2022-10-10

## 2022-10-10 NOTE — TELEPHONE ENCOUNTER
Nurse Triage SBAR    Is this a 2nd Level Triage? YES, LICENSED PRACTITIONER REVIEW IS REQUIRED    Pt # [phone]    Situation: Hx of hernia surgery- 2017 or 2018  States hernia return last May  Now with increase pain at hernia site- umbilical  Asking for sooner appt/surgery time  Pt states surgery 11-9-22 with Dr. Pepper  Pt asking for possibly next Tuesday.      Background:previous hernia surgery        Recommendation: high priority note to general surgery, Dr. Pepper.    Reason for Disposition    Patient wants to be seen    Additional Information    Negative: Passed out (i.e., fainted, collapsed and was not responding)    Negative: Shock suspected (e.g., cold/pale/clammy skin, too weak to stand, low BP, rapid pulse)    Negative: Sounds like a life-threatening emergency to the triager    Negative: Chest pain    Negative: Pain is mainly in upper abdomen (if needed ask: 'is it mainly above the belly button?')    Negative: Abdominal pain and pregnant < 20 weeks    Negative: Abdominal pain and pregnant 20 or more weeks    Negative: SEVERE abdominal pain (e.g., excruciating)    Negative: Vomiting red blood or black (coffee ground) material    Negative: Bloody, black, or tarry bowel movements  (Exception: Chronic-unchanged black-grey bowel movements and is taking iron pills or Pepto-Bismol.)    Negative: Constant abdominal pain lasting > 2 hours    Negative: Vomiting bile (green color)    Negative: Patient sounds very sick or weak to the triager    Negative: Vomiting and abdomen looks much more swollen than usual    Negative: White of the eyes have turned yellow (i.e., jaundice)    Negative: Blood in urine (red, pink, or tea-colored)    Negative: Fever > 103 F (39.4 C)    Negative: Fever > 101 F (38.3 C) and over 60 years of age    Negative: Fever > 100.0 F (37.8 C) and has diabetes mellitus or a weak immune system (e.g., HIV positive, cancer chemotherapy, organ transplant, splenectomy, chronic steroids)     "Negative: Fever > 100.0 F (37.8 C) and bedridden (e.g., nursing home patient, stroke, chronic illness, recovering from surgery)    Negative: Pregnant or could be pregnant (i.e., missed last menstrual period)    Negative: MODERATE pain (e.g., interferes with normal activities that comes and goes (cramps) lasts > 24 hours  (Exception: Pain with Vomiting or Diarrhea - see that Protocol.)    Negative: Unusual vaginal discharge    Negative: Age > 60 years    Answer Assessment - Initial Assessment Questions  1. LOCATION: \"Where does it hurt?\"   At umbilical site:      Umbilical hernia, size of nickel right now, at times  goes  to golf ball size, redness at site  comes and goes.  Able to push back - does not stay in for very lonc    Feels surgery is needed w/in the next couple weeks.    2. RADIATION: \"Does the pain shoot anywhere else?\" (e.g., chest, back)      no  3. ONSET: \"When did the pain begin?\" (e.g., minutes, hours or days ago)     Pain- has increase last 48 hour,    # 5 - intermittent, aches  Unable to take pain meds- liver problems    4. SUDDEN: \"Gradual or sudden onset?\"     Pain increased suddenly    5. PATTERN \"Does the pain come and go, or is it constant?\"     - If constant: \"Is it getting better, staying the same, or worsening?\"       (Note: Constant means the pain never goes away completely; most serious pain is constant and it progresses)      - If intermittent: \"How long does it last?\" \"Do you have pain now?\"      (Note: Intermittent means the pain goes away completely between bouts)        6. SEVERITY: \"How bad is the pain?\"  (e.g., Scale 1-10; mild, moderate, or severe)    - MILD (1-3): doesn't interfere with normal activities, abdomen soft and not tender to touch     - MODERATE (4-7): interferes with normal activities or awakens from sleep, abdomen tender to touch     - SEVERE (8-10): excruciating pain, doubled over, unable to do any normal activities       . RECURRENT SYMPTOM: \"Have you ever had this " "type of stomach pain before?\" If Yes, ask: \"When was the last time?\" and \"What happened that time?\"   # 5- see above    8. CAUSE: \"What do you think is causing the stomach pain?\"  Hernia  9. RELIEVING/AGGRAVATING FACTORS: \"What makes it better or worse?\" (e.g., movement, antacids, bowel movement)       - pain w/ BM  10. OTHER SYMPTOMS: \"Do you have any other symptoms?\" (e.g., back pain, diarrhea, fever, urination pain, vomiting)        Pain w/ BM  Denies any voiding problem, vomiting  No fever,   11. PREGNANCY: \"Is there any chance you are pregnant?\" \"When was your last menstrual period?\"NA    Protocols used: ABDOMINAL PAIN - FEMALE-A-OH      "

## 2022-10-14 ENCOUNTER — CARE COORDINATION (OUTPATIENT)
Dept: ENDOCRINOLOGY | Facility: CLINIC | Age: 62
End: 2022-10-14

## 2022-10-14 NOTE — PROGRESS NOTES
Called patient to remind her of her visit with Dr. Walden at 11:00 am on 10/17/22. I also reminded her of her questionnaire.     Gracy Soni, EMT

## 2022-10-17 ENCOUNTER — LAB (OUTPATIENT)
Dept: LAB | Facility: CLINIC | Age: 62
End: 2022-10-17
Payer: COMMERCIAL

## 2022-10-17 ENCOUNTER — OFFICE VISIT (OUTPATIENT)
Dept: ENDOCRINOLOGY | Facility: CLINIC | Age: 62
End: 2022-10-17
Payer: COMMERCIAL

## 2022-10-17 VITALS
HEART RATE: 74 BPM | OXYGEN SATURATION: 99 % | SYSTOLIC BLOOD PRESSURE: 154 MMHG | HEIGHT: 66 IN | DIASTOLIC BLOOD PRESSURE: 81 MMHG | BODY MASS INDEX: 25.5 KG/M2

## 2022-10-17 DIAGNOSIS — Z79.4 TYPE 2 DIABETES MELLITUS WITHOUT COMPLICATION, WITH LONG-TERM CURRENT USE OF INSULIN (H): ICD-10-CM

## 2022-10-17 DIAGNOSIS — E11.9 TYPE 2 DIABETES MELLITUS WITHOUT COMPLICATION, WITH LONG-TERM CURRENT USE OF INSULIN (H): ICD-10-CM

## 2022-10-17 DIAGNOSIS — Z79.4 TYPE 2 DIABETES MELLITUS WITHOUT COMPLICATION, WITH LONG-TERM CURRENT USE OF INSULIN (H): Primary | ICD-10-CM

## 2022-10-17 DIAGNOSIS — E11.9 TYPE 2 DIABETES MELLITUS WITHOUT COMPLICATION, WITH LONG-TERM CURRENT USE OF INSULIN (H): Primary | ICD-10-CM

## 2022-10-17 PROCEDURE — 99000 SPECIMEN HANDLING OFFICE-LAB: CPT

## 2022-10-17 PROCEDURE — 84681 ASSAY OF C-PEPTIDE: CPT

## 2022-10-17 PROCEDURE — 86341 ISLET CELL ANTIBODY: CPT | Mod: 90

## 2022-10-17 PROCEDURE — 99204 OFFICE O/P NEW MOD 45 MIN: CPT | Performed by: INTERNAL MEDICINE

## 2022-10-17 PROCEDURE — 36415 COLL VENOUS BLD VENIPUNCTURE: CPT

## 2022-10-17 RX ORDER — MECLIZINE HYDROCHLORIDE 25 MG/1
TABLET ORAL
COMMUNITY
Start: 2022-09-27

## 2022-10-17 RX ORDER — EMPAGLIFLOZIN 25 MG/1
25 TABLET, FILM COATED ORAL DAILY
COMMUNITY
Start: 2022-08-31

## 2022-10-17 ASSESSMENT — PAIN SCALES - GENERAL: PAINLEVEL: MILD PAIN (3)

## 2022-10-17 NOTE — PROGRESS NOTES
"  Endocrinology Clinic New Consult      Nina Shepard MRN:[id number] YOB: 1960  Primary care provider: Cristóbal Garcia     Reason for Endocrine consult: Suspected lipodystrophy    HPI:  Nina Shepard is a 61 year old female with PMHx of diabetes, breast cancer s/p bilateral radical mastectomy, CAD s/p SWAPNIL in 2008, hypothyroidism, psoriatic arthritis, and recently diagnosed WARREN who was referred to endocrine clinic for further evaluation of suspected lipodystrophy following incidental finding of fatty liver disease and cirrhosis on abdominal ultrasound completed as a result of her abdominal hernia.     Nina reports she has had significant problems keeping her blood sugars under 200 and has started noticing that she \"looked pregnant\" about a year ago. She doesn't think it is all related to her known umbilical hernia. She has had what she described as a \"big stomach her whole life\" that was disproportionate to the rest of her body despite being in good shape.  She was called a \"spider\" when she was growing up due to her relatively large abdomen compared to thinner extremities.      Diabetes history and management  Diagnosed as type 2 around 2002.  Reports highest hemoglobin A1c was over 12%, but more recently has been between 6 and 8%.  She does note recent blood sugars have been in the 200s and that despite strenuous exercise they do not drop more than 20 or 30 points.  She has noted hypoglycemia in the past, but only on rare occasions and nothing recently. She has been taking both long-acting and short acting insulin since her initial diagnosis.  She was also started on metformin in 2007, but has not been taking this daily due to GI side effects.  Rather she takes this only as a as needed medication when her blood sugars are greater than 300.      Current insulin dosing:  -Lantus 70 units.  Takes this daily although at different times of the day depending on her schedule.  Is " currently out of this medication due to cost issues  -Takes NovoLog 30 units 3 times daily 30 minutes before meals.  -Is also taking Jardiance, recently switched from Invokana due to insurance coverage.  Noted a significant decrease in blood sugars when she initially started this medication, but now that she has been on it for a long time she no longer sees this benefit.    ROS:  All 12 systems were reviewed and negative except as mentioned in HPI    Past Medical/Surgical History:  Past Medical History:   Diagnosis Date     Bell's palsy      Cancer (H)      Diabetes (H)      Heart disease      Hypertension      WARREN (nonalcoholic steatohepatitis)      PONV (postoperative nausea and vomiting)      Thyroid disease      Past Surgical History:   Procedure Laterality Date     ABDOMINAL ADHESION SURGERY       APPENDECTOMY       CARDIAC SURGERY        SECTION       COLONOSCOPY N/A 2022    Procedure: COLONOSCOPY;  Surgeon: Rogerio Carranza MD;  Location:  GI     ENT SURGERY       ESOPHAGOSCOPY, GASTROSCOPY, DUODENOSCOPY (EGD), COMBINED N/A 2022    Procedure: ESOPHAGOGASTRODUODENOSCOPY (EGD);  Surgeon: Rogerio Carranza MD;  Location:  GI     HYSTERECTOMY       MAMMOPLASTY AUGMENTATION Bilateral 2016    Procedure: BILATERAL BREAST IMPLANT EXCHANGE;  Surgeon: Ermias Barakat MD;  Location: [facility];  Service:      MASTECTOMY Bilateral      TONSILLECTOMY         Allergies:  Allergies   Allergen Reactions     Codeine GI Disturbance     Other reaction(s): Gastrointestinal       PTA Meds:  Prior to Admission medications    Medication Sig Last Dose Taking? Auth Provider Long Term End Date   ammonium lactate (LAC-HYDRIN) 12 % external lotion  Taking Yes Reported, Patient     Apremilast (OTEZLA) 10 & 20 & 30 MG TBPK  Taking Yes Reported, Patient     artificial tears (SOOTHE NIGHT TIME) ophthalmic ointment Apply to left eye nightly for 7 days.  May substitute generic. Taking Yes  Kulwinder Mon MD     atorvastatin (LIPITOR) 20 MG tablet Take 20 mg by mouth daily Taking Yes Reported, Patient Yes    bisacodyl (DULCOLAX) 5 MG EC tablet Take as directed. One day before exam take 2 tablets at 3 PM. Take 2 tablets at 11 PM. Taking Yes Rogerio Carranza MD     calcium carbonate-vitamin D (OS-ABDON WITH D) 500-200 MG-UNIT tablet Take 2 tablets by mouth daily Taking Yes Reported, Patient     carboxymethylcellulose (REFRESH PLUS) 0.5 % SOLN ophthalmic solution 1 drop every 3 hours Taking Yes Reported, Patient     conjugated estrogens (PREMARIN) 0.625 MG/GM vaginal cream Place 0.5 g vaginally Taking Yes Reported, Patient     cyclobenzaprine (FLEXERIL) 10 MG tablet Take 10 mg by mouth Taking Yes Reported, Patient     cyclobenzaprine (FLEXERIL) 5 MG tablet Take 5 mg by mouth Taking Yes Reported, Patient     hypromellose (GENTEAL) 0.3 % SOLN ophthalmic solution 1 drop Taking Yes Reported, Patient     insulin aspart (NOVOLOG PEN) 100 UNIT/ML pen Inject 30 Units Subcutaneous Taking Yes Reported, Patient Yes    insulin glargine (LANTUS VIAL) 100 UNIT/ML vial Inject 70 Units Subcutaneous Taking Yes Reported, Patient Yes    JARDIANCE 25 MG TABS tablet Take 25 mg by mouth daily Taking Yes Reported, Patient     levothyroxine (SYNTHROID/LEVOTHROID) 100 MCG tablet Take 100 mcg by mouth Taking Yes Reported, Patient Yes    LORazepam (ATIVAN) 1 MG tablet Take 1 mg by mouth every 6 hours as needed for anxiety Taking Yes Reported, Patient     meclizine (ANTIVERT) 25 MG tablet TAKE 1 TABLET BY MOUTH TWICE DAILY AS NEEDED FOR DIZZINESS Taking Yes Reported, Patient     metoprolol succinate ER (TOPROL XL) 25 MG 24 hr tablet Take 1 tablet by mouth daily Taking Yes Reported, Patient Yes    Multiple Vitamin (MULTIVITAMIN ADULT PO)  Taking Yes Reported, Patient     NONFORMULARY TUDCA supplement Taking Yes Reported, Patient     NONFORMULARY Liver support 1 tab daily Taking Yes Reported, Patient     ondansetron  (ZOFRAN) 4 MG tablet Take 4 mg by mouth Taking Yes Reported, Patient     polyethylene glycol (GOLYTELY) 236 g suspension Take as directed. One day before exam fill the jug with water. Cover and shake until well mixed. At 6 PM start drinking an 8oz glass of mixture every 15 minutes until jug is 1/2 empty. Store remainder in the refrigerator.  At 11 PM Start drinking the other half of the Golytely jug. Drink one 8-ounce glass every 15 minutes until the jug is empty. Taking Yes Rogerio Carranza MD     polyethylene glycol-propylene glycol (SYSTANE ULTRA) 0.4-0.3 % SOLN ophthalmic solution 1-2 drops Taking Yes Reported, Patient     valsartan (DIOVAN) 40 MG tablet Take 1 tablet by mouth daily Taking Yes Reported, Patient Yes    zolpidem (AMBIEN) 5 MG tablet Take 5 mg by mouth Taking Yes Reported, Patient     INVOKANA 300 MG tablet Take 300 mg by mouth daily  Patient not taking: Reported on 10/17/2022 Not Taking  Reported, Patient     valACYclovir (VALTREX) 1000 mg tablet Take 1 tablet (1,000 mg) by mouth 3 times daily for 7 days   Kulwinder Mon MD Yes 9/23/20        Current heard:   Current Outpatient Medications   Medication     ammonium lactate (LAC-HYDRIN) 12 % external lotion     Apremilast (OTEZLA) 10 & 20 & 30 MG TBPK     artificial tears (SOOTHE NIGHT TIME) ophthalmic ointment     atorvastatin (LIPITOR) 20 MG tablet     bisacodyl (DULCOLAX) 5 MG EC tablet     calcium carbonate-vitamin D (OS-ABDON WITH D) 500-200 MG-UNIT tablet     carboxymethylcellulose (REFRESH PLUS) 0.5 % SOLN ophthalmic solution     conjugated estrogens (PREMARIN) 0.625 MG/GM vaginal cream     cyclobenzaprine (FLEXERIL) 10 MG tablet     cyclobenzaprine (FLEXERIL) 5 MG tablet     hypromellose (GENTEAL) 0.3 % SOLN ophthalmic solution     insulin aspart (NOVOLOG PEN) 100 UNIT/ML pen     insulin glargine (LANTUS VIAL) 100 UNIT/ML vial     JARDIANCE 25 MG TABS tablet     levothyroxine (SYNTHROID/LEVOTHROID) 100 MCG tablet     LORazepam  (ATIVAN) 1 MG tablet     meclizine (ANTIVERT) 25 MG tablet     metoprolol succinate ER (TOPROL XL) 25 MG 24 hr tablet     Multiple Vitamin (MULTIVITAMIN ADULT PO)     NONFORMULARY     NONFORMULARY     ondansetron (ZOFRAN) 4 MG tablet     polyethylene glycol (GOLYTELY) 236 g suspension     polyethylene glycol-propylene glycol (SYSTANE ULTRA) 0.4-0.3 % SOLN ophthalmic solution     valsartan (DIOVAN) 40 MG tablet     zolpidem (AMBIEN) 5 MG tablet     INVOKANA 300 MG tablet     valACYclovir (VALTREX) 1000 mg tablet     No current facility-administered medications for this visit.       Family History:  Family History   Problem Relation Age of Onset     Breast Cancer Mother      Coronary Artery Disease Father    Multiple blood relatives with history of hypothyroidism including mother and siblings      Social History:  Social History     Tobacco Use     Smoking status: Never     Smokeless tobacco: Never   Substance Use Topics     Alcohol use: No   Is , but does not live with .  Currently lives with boyfriend in a house [place] hour north of the [place] area.  Also has a home in the [facility].   Retired nurse, struggles with insurance coverage for medications.  Walks 4 to 5 miles most days, eats 3 meals and does not snack throughout the day.  No personal smoking history but does report significant secondhand smoke in the past  No current or previous alcohol use    Physical examination:  General appearance: seated comfortably with legs crossed in the chair during assessment. Not in any acute distress  HEENT: Atraumatic. PEERLA.  Neck: No abnormal fat distribution  Lungs: Respiration unlabored  Heart: Regular rate and rhythm, S1-S2 heard and no murmurs appreciated   Abdomen: soft, nontender, notable umbilical hernia, increased central adiposity  Neurological: conscious and oriented. Speech: normal. Moving all four extremities equally  Extremities: no edema or abnormal fat distribution noted. Do not appear thinner  proportionally than what would be expected. Chronic sequela of psoriatic arthritis in hands  Psychiatric: normal mood and affect. Normal judgment    Endocrine Labs:  Lipid panel 11/2002 triglycerides 392 HDL 24 LDL 81.      Similar pattern and subsequent laboratory evaluations.      Lipid panel from 04/2022 triglycerides 617 and HDL 24     Triglycerides went from around 200 from 8495-9587 to 505 in 2021 and 617 in 2022    Hemoglobin A1c 8/2022 7.4%    TSH 04/2022 0.45     Assessment and Plan:     WARREN  Suspected lipodystrophy  Do not think Nina meets criteria for formal diagnosis of congenital lipodystrophy, but she she does have evidence of fat dysregulation. This includes her long history of central adiposity versus thin extremities, decades long history of low HDL and LDL, fatty liver disease, and high insulin resistance. She also reported that the stomach fat that was harvested for use during her breast reconstruction was deemed of too small a quantity and abnormal quality. She has longstanding history of hypertriglyceridemia in the 200s which is consistent with psoriatic process, but she reports elevation in the 500s and 600s with the initiation of a new medicine called Otezla.  Initial management of lipodystrophy would consist of treating metabolic disturbances including hyperglycemia and hypertriglyceridemia.  She is currently on a statin already, but could consider increasing the dose or adding a fibrate. Think she may benefit from GLP-1 agonist as this can shows benefits for both fatty liver disease and diabetes management. We will wait for initial evaluations prior to starting the medication.  - Nothing for now    Diabetes  Diagnosed in 2002 and thought to be due to to type II, however this has never been confirmed.  Uses both long-acting and short acting insulin as well as Jardiance. Also takes metformin only when her blood sugars are in the 300s or 400s. Blood sugars remain difficult to control despite  relatively high doses of insulin, consistent exercise, and relatively well-controlled diet.  Reports her normal readings are in the 200s.  Pending laboratory evaluation will consider decreasing metformin to highest tolerated dose so that she is willing to take this daily. Would also consider starting a GLP-1 agonist as above.  - Will obtain BERONICA-65 antibodies and C-peptide for further diabetes evaluation    Coronary artery disease  PCI with SWAPNIL in October 2008 (age 47) due to 95% occlusion of the LAD coronary artery disease following symptoms of exertional chest pain. No significant family history of early MI or death from cardiac issues. On atorvastatin 20 mg daily    Psoriatic arthritis  No history of disease modifying drugs. Only recently stopped taking Otezla at the request of her liver specialist.     Hypothyroidism  Currently taking 100 mcg levothyroxine.  Recent TSH was normal.    Martin Castro MS3 assisted in the interview and documentation for this encounter    The patient was seen, examined and discussed with Dr. Iram MD    Pt was seen and evaluated with the medical student. Clinical data was reviewed  and discussed with the patient and with the student. The note above reflects our  history and findings, and the evaluation and plan reflects my clinical opinion.    Willie Walden MD

## 2022-10-17 NOTE — NURSING NOTE
"(   Chief Complaint   Patient presents with     New Patient     New MWM    )    (   )  ( Height: 167.6 cm (5' 6\") )  (   )  (   )  (   )  (   )  (   )  (   )  (   )    ( BP: (!) 154/81 )  (   )  (   )  (   )  ( Pulse: 74 )  (   )  ( SpO2: 99 % )    (   Patient Active Problem List   Diagnosis     Bell's paralysis     Cirrhosis of liver without ascites (H)     Coronary atherosclerosis     Essential hypertension     Generalized anxiety disorder     Hypothyroidism     Major depressive disorder, recurrent episode, in partial remission (H)     Mixed hyperlipidemia     Psoriatic arthropathy (H)     Type II diabetes mellitus (H)    )  (   Current Outpatient Medications   Medication Sig Dispense Refill     ammonium lactate (LAC-HYDRIN) 12 % external lotion        Apremilast (OTEZLA) 10 & 20 & 30 MG TBPK        artificial tears (SOOTHE NIGHT TIME) ophthalmic ointment Apply to left eye nightly for 7 days.  May substitute generic. 3.5 g 0     atorvastatin (LIPITOR) 20 MG tablet Take 20 mg by mouth daily       bisacodyl (DULCOLAX) 5 MG EC tablet Take as directed. One day before exam take 2 tablets at 3 PM. Take 2 tablets at 11 PM. 4 tablet 0     calcium carbonate-vitamin D (OS-ABDON WITH D) 500-200 MG-UNIT tablet Take 2 tablets by mouth daily       carboxymethylcellulose (REFRESH PLUS) 0.5 % SOLN ophthalmic solution 1 drop every 3 hours       conjugated estrogens (PREMARIN) 0.625 MG/GM vaginal cream Place 0.5 g vaginally       cyclobenzaprine (FLEXERIL) 10 MG tablet Take 10 mg by mouth       cyclobenzaprine (FLEXERIL) 5 MG tablet Take 5 mg by mouth       hypromellose (GENTEAL) 0.3 % SOLN ophthalmic solution 1 drop       insulin aspart (NOVOLOG PEN) 100 UNIT/ML pen Inject 30 Units Subcutaneous       insulin glargine (LANTUS VIAL) 100 UNIT/ML vial Inject 70 Units Subcutaneous       JARDIANCE 25 MG TABS tablet Take 25 mg by mouth daily       levothyroxine (SYNTHROID/LEVOTHROID) 100 MCG tablet Take 100 mcg by mouth       LORazepam " (ATIVAN) 1 MG tablet Take 1 mg by mouth every 6 hours as needed for anxiety       meclizine (ANTIVERT) 25 MG tablet TAKE 1 TABLET BY MOUTH TWICE DAILY AS NEEDED FOR DIZZINESS       metoprolol succinate ER (TOPROL XL) 25 MG 24 hr tablet Take 1 tablet by mouth daily       Multiple Vitamin (MULTIVITAMIN ADULT PO)        NONFORMULARY TUDCA supplement       NONFORMULARY Liver support 1 tab daily       ondansetron (ZOFRAN) 4 MG tablet Take 4 mg by mouth       polyethylene glycol (GOLYTELY) 236 g suspension Take as directed. One day before exam fill the jug with water. Cover and shake until well mixed. At 6 PM start drinking an 8oz glass of mixture every 15 minutes until jug is 1/2 empty. Store remainder in the refrigerator.  At 11 PM Start drinking the other half of the Golytely jug. Drink one 8-ounce glass every 15 minutes until the jug is empty. 4000 mL 0     polyethylene glycol-propylene glycol (SYSTANE ULTRA) 0.4-0.3 % SOLN ophthalmic solution 1-2 drops       valsartan (DIOVAN) 40 MG tablet Take 1 tablet by mouth daily       zolpidem (AMBIEN) 5 MG tablet Take 5 mg by mouth       INVOKANA 300 MG tablet Take 300 mg by mouth daily (Patient not taking: Reported on 10/17/2022)       valACYclovir (VALTREX) 1000 mg tablet Take 1 tablet (1,000 mg) by mouth 3 times daily for 7 days 21 tablet 0    )  ( Diabetes Eval:    )    ( Pain Eval:  Mild Pain (3) )    ( Wound Eval:       )    (   History   Smoking Status     Never   Smokeless Tobacco     Never    )    ( Signed By:  Gracy Soni, EMT; October 17, 2022; 10:57 AM )

## 2022-10-17 NOTE — LETTER
"10/17/2022       RE: Nina Shepard  [address]  [place] [address]     Dear Colleague,    Thank you for referring your patient, Nina Shepard, to the [facility] [place] at [facility]. Please see a copy of my visit note below.      Endocrinology Clinic New Consult      Nina Shepard MRN:[id number] YOB: 1960  [facility]     Reason for Endocrine consult: Suspected lipodystrophy    HPI:  Nina Shepard is a 61 year old female with PMHx of diabetes, breast cancer s/p bilateral radical mastectomy, CAD s/p SWAPNIL in 2008, hypothyroidism, psoriatic arthritis, and recently diagnosed WARREN who was referred to endocrine clinic for further evaluation of suspected lipodystrophy following incidental finding of fatty liver disease and cirrhosis on abdominal ultrasound completed as a result of her abdominal hernia.     Nina reports she has had significant problems keeping her blood sugars under 200 and has started noticing that she \"looked pregnant\" about a year ago. She doesn't think it is all related to her known umbilical hernia. She has had what she described as a \"big stomach her whole life\" that was disproportionate to the rest of her body despite being in good shape.  She was called a \"spider\" when she was growing up due to her relatively large abdomen compared to thinner extremities.      Diabetes history and management  Diagnosed as type 2 around 2002.  Reports highest hemoglobin A1c was over 12%, but more recently has been between 6 and 8%.  She does note recent blood sugars have been in the 200s and that despite strenuous exercise they do not drop more than 20 or 30 points.  She has noted hypoglycemia in the past, but only on rare occasions and nothing recently. She has been taking both long-acting and short acting insulin since her initial diagnosis.  She was also " started on metformin in , but has not been taking this daily due to GI side effects.  Rather she takes this only as a as needed medication when her blood sugars are greater than 300.      Current insulin dosing:  -Lantus 70 units.  Takes this daily although at different times of the day depending on her schedule.  Is currently out of this medication due to cost issues  -Takes NovoLog 30 units 3 times daily 30 minutes before meals.  -Is also taking Jardiance, recently switched from Invokana due to insurance coverage.  Noted a significant decrease in blood sugars when she initially started this medication, but now that she has been on it for a long time she no longer sees this benefit.    ROS:  All 12 systems were reviewed and negative except as mentioned in HPI    Past Medical/Surgical History:  Past Medical History:   Diagnosis Date     Bell's palsy      Cancer (H)      Diabetes (H)      Heart disease      Hypertension      WARREN (nonalcoholic steatohepatitis)      PONV (postoperative nausea and vomiting)      Thyroid disease      Past Surgical History:   Procedure Laterality Date     ABDOMINAL ADHESION SURGERY       APPENDECTOMY       CARDIAC SURGERY        SECTION       COLONOSCOPY N/A 2022    Procedure: COLONOSCOPY;  Surgeon: Rogerio Carranza MD;  Location:  GI     ENT SURGERY       ESOPHAGOSCOPY, GASTROSCOPY, DUODENOSCOPY (EGD), COMBINED N/A 2022    Procedure: ESOPHAGOGASTRODUODENOSCOPY (EGD);  Surgeon: Rogerio Carranza MD;  Location:  GI     HYSTERECTOMY       MAMMOPLASTY AUGMENTATION Bilateral 2016    Procedure: BILATERAL BREAST IMPLANT EXCHANGE;  Surgeon: Ermias Barakat MD;  Location: [facility];  Service:      MASTECTOMY Bilateral      TONSILLECTOMY         Allergies:  Allergies   Allergen Reactions     Codeine GI Disturbance     Other reaction(s): Gastrointestinal       PTA Meds:  Prior to Admission medications    Medication Sig Last Dose Taking? Auth  Provider Long Term End Date   ammonium lactate (LAC-HYDRIN) 12 % external lotion  Taking Yes Reported, Patient     Apremilast (OTEZLA) 10 & 20 & 30 MG TBPK  Taking Yes Reported, Patient     artificial tears (SOOTHE NIGHT TIME) ophthalmic ointment Apply to left eye nightly for 7 days.  May substitute generic. Taking Yes Kulwinder Mon MD     atorvastatin (LIPITOR) 20 MG tablet Take 20 mg by mouth daily Taking Yes Reported, Patient Yes    bisacodyl (DULCOLAX) 5 MG EC tablet Take as directed. One day before exam take 2 tablets at 3 PM. Take 2 tablets at 11 PM. Taking Yes Rogerio Carranza MD     calcium carbonate-vitamin D (OS-BADON WITH D) 500-200 MG-UNIT tablet Take 2 tablets by mouth daily Taking Yes Reported, Patient     carboxymethylcellulose (REFRESH PLUS) 0.5 % SOLN ophthalmic solution 1 drop every 3 hours Taking Yes Reported, Patient     conjugated estrogens (PREMARIN) 0.625 MG/GM vaginal cream Place 0.5 g vaginally Taking Yes Reported, Patient     cyclobenzaprine (FLEXERIL) 10 MG tablet Take 10 mg by mouth Taking Yes Reported, Patient     cyclobenzaprine (FLEXERIL) 5 MG tablet Take 5 mg by mouth Taking Yes Reported, Patient     hypromellose (GENTEAL) 0.3 % SOLN ophthalmic solution 1 drop Taking Yes Reported, Patient     insulin aspart (NOVOLOG PEN) 100 UNIT/ML pen Inject 30 Units Subcutaneous Taking Yes Reported, Patient Yes    insulin glargine (LANTUS VIAL) 100 UNIT/ML vial Inject 70 Units Subcutaneous Taking Yes Reported, Patient Yes    JARDIANCE 25 MG TABS tablet Take 25 mg by mouth daily Taking Yes Reported, Patient     levothyroxine (SYNTHROID/LEVOTHROID) 100 MCG tablet Take 100 mcg by mouth Taking Yes Reported, Patient Yes    LORazepam (ATIVAN) 1 MG tablet Take 1 mg by mouth every 6 hours as needed for anxiety Taking Yes Reported, Patient     meclizine (ANTIVERT) 25 MG tablet TAKE 1 TABLET BY MOUTH TWICE DAILY AS NEEDED FOR DIZZINESS Taking Yes Reported, Patient     metoprolol succinate ER  (TOPROL XL) 25 MG 24 hr tablet Take 1 tablet by mouth daily Taking Yes Reported, Patient Yes    Multiple Vitamin (MULTIVITAMIN ADULT PO)  Taking Yes Reported, Patient     NONFORMULARY TUDCA supplement Taking Yes Reported, Patient     NONFORMULARY Liver support 1 tab daily Taking Yes Reported, Patient     ondansetron (ZOFRAN) 4 MG tablet Take 4 mg by mouth Taking Yes Reported, Patient     polyethylene glycol (GOLYTELY) 236 g suspension Take as directed. One day before exam fill the jug with water. Cover and shake until well mixed. At 6 PM start drinking an 8oz glass of mixture every 15 minutes until jug is 1/2 empty. Store remainder in the refrigerator.  At 11 PM Start drinking the other half of the Golytely jug. Drink one 8-ounce glass every 15 minutes until the jug is empty. Taking Yes Rogerio Carranza MD     polyethylene glycol-propylene glycol (SYSTANE ULTRA) 0.4-0.3 % SOLN ophthalmic solution 1-2 drops Taking Yes Reported, Patient     valsartan (DIOVAN) 40 MG tablet Take 1 tablet by mouth daily Taking Yes Reported, Patient Yes    zolpidem (AMBIEN) 5 MG tablet Take 5 mg by mouth Taking Yes Reported, Patient     INVOKANA 300 MG tablet Take 300 mg by mouth daily  Patient not taking: Reported on 10/17/2022 Not Taking  Reported, Patient     valACYclovir (VALTREX) 1000 mg tablet Take 1 tablet (1,000 mg) by mouth 3 times daily for 7 days   Kulwinder Mon MD Yes 9/23/20        Current heard:   Current Outpatient Medications   Medication     ammonium lactate (LAC-HYDRIN) 12 % external lotion     Apremilast (OTEZLA) 10 & 20 & 30 MG TBPK     artificial tears (SOOTHE NIGHT TIME) ophthalmic ointment     atorvastatin (LIPITOR) 20 MG tablet     bisacodyl (DULCOLAX) 5 MG EC tablet     calcium carbonate-vitamin D (OS-ABDON WITH D) 500-200 MG-UNIT tablet     carboxymethylcellulose (REFRESH PLUS) 0.5 % SOLN ophthalmic solution     conjugated estrogens (PREMARIN) 0.625 MG/GM vaginal cream     cyclobenzaprine (FLEXERIL)  10 MG tablet     cyclobenzaprine (FLEXERIL) 5 MG tablet     hypromellose (GENTEAL) 0.3 % SOLN ophthalmic solution     insulin aspart (NOVOLOG PEN) 100 UNIT/ML pen     insulin glargine (LANTUS VIAL) 100 UNIT/ML vial     JARDIANCE 25 MG TABS tablet     levothyroxine (SYNTHROID/LEVOTHROID) 100 MCG tablet     LORazepam (ATIVAN) 1 MG tablet     meclizine (ANTIVERT) 25 MG tablet     metoprolol succinate ER (TOPROL XL) 25 MG 24 hr tablet     Multiple Vitamin (MULTIVITAMIN ADULT PO)     NONFORMULARY     NONFORMULARY     ondansetron (ZOFRAN) 4 MG tablet     polyethylene glycol (GOLYTELY) 236 g suspension     polyethylene glycol-propylene glycol (SYSTANE ULTRA) 0.4-0.3 % SOLN ophthalmic solution     valsartan (DIOVAN) 40 MG tablet     zolpidem (AMBIEN) 5 MG tablet     INVOKANA 300 MG tablet     valACYclovir (VALTREX) 1000 mg tablet     No current facility-administered medications for this visit.       Family History:  Family History   Problem Relation Age of Onset     Breast Cancer Mother      Coronary Artery Disease Father    Multiple blood relatives with history of hypothyroidism including mother and siblings      Social History:  Social History     Tobacco Use     Smoking status: Never     Smokeless tobacco: Never   Substance Use Topics     Alcohol use: No   Is , but does not live with .  Currently lives with boyfriend in a house [place] north of the [place] area.  Also has a home in the [facility].   Retired nurse, struggles with insurance coverage for medications.  Walks 4 to 5 miles most days, eats 3 meals and does not snack throughout the day.  No personal smoking history but does report significant secondhand smoke in the past  No current or previous alcohol use    Physical examination:  General appearance: seated comfortably with legs crossed in the chair during assessment. Not in any acute distress  HEENT: Atraumatic. PEERLA.  Neck: No abnormal fat distribution  Lungs: Respiration unlabored  Heart:  Regular rate and rhythm, S1-S2 heard and no murmurs appreciated   Abdomen: soft, nontender, notable umbilical hernia, increased central adiposity  Neurological: conscious and oriented. Speech: normal. Moving all four extremities equally  Extremities: no edema or abnormal fat distribution noted. Do not appear thinner proportionally than what would be expected. Chronic sequela of psoriatic arthritis in hands  Psychiatric: normal mood and affect. Normal judgment    Endocrine Labs:  Lipid panel 11/2002 triglycerides 392 HDL 24 LDL 81.      Similar pattern and subsequent laboratory evaluations.      Lipid panel from 04/2022 triglycerides 617 and HDL 24     Triglycerides went from around 200 from 3549-7749 to 505 in 2021 and 617 in 2022    Hemoglobin A1c 8/2022 7.4%    TSH 04/2022 0.45     Assessment and Plan:     WARREN  Suspected lipodystrophy  Do not think Nina meets criteria for formal diagnosis of congenital lipodystrophy, but she she does have evidence of fat dysregulation. This includes her long history of central adiposity versus thin extremities, decades long history of low HDL and LDL, fatty liver disease, and high insulin resistance. She also reported that the stomach fat that was harvested for use during her breast reconstruction was deemed of too small a quantity and abnormal quality. She has longstanding history of hypertriglyceridemia in the 200s which is consistent with psoriatic process, but she reports elevation in the 500s and 600s with the initiation of a new medicine called Otezla.  Initial management of lipodystrophy would consist of treating metabolic disturbances including hyperglycemia and hypertriglyceridemia.  She is currently on a statin already, but could consider increasing the dose or adding a fibrate. Think she may benefit from GLP-1 agonist as this can shows benefits for both fatty liver disease and diabetes management. We will wait for initial evaluations prior to starting the  medication.  - Nothing for now    Diabetes  Diagnosed in 2002 and thought to be due to to type II, however this has never been confirmed.  Uses both long-acting and short acting insulin as well as Jardiance. Also takes metformin only when her blood sugars are in the 300s or 400s. Blood sugars remain difficult to control despite relatively high doses of insulin, consistent exercise, and relatively well-controlled diet.  Reports her normal readings are in the 200s.  Pending laboratory evaluation will consider decreasing metformin to highest tolerated dose so that she is willing to take this daily. Would also consider starting a GLP-1 agonist as above.  - Will obtain BERONICA-65 antibodies and C-peptide for further diabetes evaluation    Coronary artery disease  PCI with SWAPNIL in October 2008 (age 47) due to 95% occlusion of the LAD coronary artery disease following symptoms of exertional chest pain. No significant family history of early MI or death from cardiac issues. On atorvastatin 20 mg daily    Psoriatic arthritis  No history of disease modifying drugs. Only recently stopped taking Otezla at the request of her liver specialist.     Hypothyroidism  Currently taking 100 mcg levothyroxine.  Recent TSH was normal.    Martin Castro MS3 assisted in the interview and documentation for this encounter    The patient was seen, examined and discussed with Dr. Iram MD    Pt was seen and evaluated with the medical student. Clinical data was reviewed  and discussed with the patient and with the student. The note above reflects our  history and findings, and the evaluation and plan reflects my clinical opinion.    Willie Walden MD

## 2022-10-18 ENCOUNTER — PRE VISIT (OUTPATIENT)
Dept: SURGERY | Facility: CLINIC | Age: 62
End: 2022-10-18

## 2022-10-18 LAB — C PEPTIDE SERPL-MCNC: 7 NG/ML (ref 0.9–6.9)

## 2022-10-21 LAB — GAD65 AB SER IA-ACNC: <5 IU/ML

## 2022-10-25 ENCOUNTER — TELEPHONE (OUTPATIENT)
Dept: GASTROENTEROLOGY | Facility: CLINIC | Age: 62
End: 2022-10-25

## 2022-11-02 DIAGNOSIS — K74.60 CIRRHOSIS OF LIVER WITHOUT ASCITES, UNSPECIFIED HEPATIC CIRRHOSIS TYPE (H): Primary | ICD-10-CM

## 2022-11-07 ENCOUNTER — TELEPHONE (OUTPATIENT)
Dept: ENDOCRINOLOGY | Facility: CLINIC | Age: 62
End: 2022-11-07

## 2022-11-07 NOTE — TELEPHONE ENCOUNTER
Patient called back, stating she is very concerned. She states her bmi isn't high enough to be working with weight management. Patient stated she follows her diet & exercises. She is taking 35-40 units with her meals & her blood sugar is still high. Please advise. She would like to speak with someone asap. She is okay with communicationt through [employer] as well.    [phone] okay to leave message or send message through [employer]

## 2022-11-09 ENCOUNTER — PRE VISIT (OUTPATIENT)
Dept: SURGERY | Facility: CLINIC | Age: 62
End: 2022-11-09

## 2022-11-10 ENCOUNTER — TELEPHONE (OUTPATIENT)
Dept: ENDOCRINOLOGY | Facility: CLINIC | Age: 62
End: 2022-11-10

## 2022-11-10 DIAGNOSIS — E11.65 TYPE 2 DIABETES MELLITUS WITH HYPERGLYCEMIA, WITH LONG-TERM CURRENT USE OF INSULIN (H): Primary | ICD-10-CM

## 2022-11-10 DIAGNOSIS — Z79.4 TYPE 2 DIABETES MELLITUS WITH HYPERGLYCEMIA, WITH LONG-TERM CURRENT USE OF INSULIN (H): Primary | ICD-10-CM

## 2022-11-10 NOTE — TELEPHONE ENCOUNTER
Prior Authorization Retail Medication Request    Medication/Dose: Ozempic  ICD code (if different than what is on RX):  Type 2 diabetes mellitus with hyperglycemia, with long-term current use of insulin (H) [E11.65, Z79.4]  - Primary     Previously Tried and Failed:  History of diet and exercise, see DM history and current meds  Rationale:  Nina Shepard is a 61 year old female with PMHx of diabetes, breast cancer s/p bilateral radical mastectomy, CAD s/p SWAPNIL in 2008, hypothyroidism, psoriatic arthritis, and recently diagnosed WARREN who was referred to endocrine clinic for further evaluation of suspected lipodystrophy following incidental finding of fatty liver disease and cirrhosis on abdominal ultrasound completed as a result of her abdominal hernia. Nina reports she has had significant problems keeping her blood sugars under 200.     Diabetes history and management  Diagnosed as type 2 around 2002.  Reports highest hemoglobin A1c was over 12%, but more recently has been between 6 and 8%.  She does note recent blood sugars have been in the 200s and that despite strenuous exercise they do not drop more than 20 or 30 points.  She has noted hypoglycemia in the past, but only on rare occasions and nothing recently. She has been taking both long-acting and short acting insulin since her initial diagnosis.  She was also started on metformin in 2007, but has not been taking this daily due to GI side effects.  Rather she takes this only as a as needed medication when her blood sugars are greater than 300.       Current insulin dosing:  -Lantus 70 units.  Takes this daily although at different times of the day depending on her schedule.  Is currently out of this medication due to cost issues  -Takes NovoLog 30 units 3 times daily 30 minutes before meals.  -Is also taking Jardiance, recently switched from Invokana due to insurance coverage. Noted a significant decrease in blood sugars when she initially started this  medication, but now that she has been on it for a long time she no longer sees this benefit.       Insurance Name:    Insurance ID:        Pharmacy Information (if different than what is on RX)  [facility]:  [facility] [address] [place] [address] [id number]  Phone:  [phone]

## 2022-11-10 NOTE — PATIENT INSTRUCTIONS
MEDICATION STARTED AT THIS APPOINTMENT    We are starting a GLP-1 (Glucagon-like Peptide-1) medication. Examples of this medication include Byetta, Bydureon, or Victoza, etc. The medication chosen will depend on insurance coverage, and can be changed to the medication that is covered under your plan. These medications work the same, in that they can help you lose weight and control your blood sugar. One of the ways it works is by slowing down the rate that food leaves your stomach. You feel gagnon and will eat less.  It also helps regulate hormones that can help improve your blood sugars.    Usually short acting insulins or oral agents are stopped or decreased by the doctor at the appointment. Low blood sugars are rare but can happen if patients are on insulin or other oral agents. If you notice consistent low sugars or high sugars, your medication may need to be adjusted after your appointment. If this is the case, please call RN and provide her your blood sugar record from the last 3-4 days. The RN will get in touch with the doctor and call you back/Knoda message with recommendations. We tolerate high sugars for a bit, so if sugars are running 180-200, this is ok. As weight starts dropping the blood sugars should too. If readings are consistently over 200 for 1-2 weeks, then you should call the doctor/nurse.    Types of GLP-1 medications include:    Victoza: Starting dose is 0.6 mg for a week, then 1.2 mg for a week, and then 1.8 mg thereafter (if prescribed by doctor). This medication is given daily. Pen needles need to be ordered also.    Ozempic: Starting dose is 0.25 mg once weekly for 4 weeks, and then increase to 0.5 mg weekly. If after 4 weeks of being on 0.5 mg weekly dosing and still wanting additional weight loss and/or blood sugar benefits, check with your doctor to see if they want to increase the dose to 1 mg weekly. Pen needles come in the Ozempic pen box.    Trulicity: Starting dose is 0.75 mg  once weekly.  If after 1-3 months of being on 0.75 mg weekly and still wanting additional weight loss and/or blood sugar benefits, check with your doctor to see if they want to increase the dose to 1.5 mg weekly.    Bydureon: Starting dose is 2 mg and is given weekly. The patient should pick one day a week and give the medication on that day. Pen needles need to be ordered also.    Byetta: Starting dose is 5 mcg twice daily. This is given for the first month. Check with the doctor after a month to see if they want the dose increased to 10 mcg BID. Pen needles need to be ordered also.     Side effects of GLP- Medications include: The most common side effects are all GI related and consist of: nausea, constipation, diarrhea, burping, or gassiness. Patients are advised to eat slowly and less, and nausea typically passes if people can stick it out.     The risk of pancreatitis (inflammation of the pancreas) has been associated with this type of medication, but is very rare.  If you have had pancreatitis in the past, this medication may not be for you. Please let us know about any past history of pancreas problems.      Symptoms of pancreatitis include: Pain in your upper stomach area which  may travel to your back and be worse after eating. Your stomach area may be tender to the touch.  You may have vomiting or nausea and/or have a fever. If you should develop any of these symptoms, stop the medication and contact your primary care doctor. They will do a blood test to check for pancreatitis.         There is a small chance you may have some low blood sugar after taking the medication.   The signs of low blood sugar are:  o Weakness  o Shaky   o Hungry  o Sweating  o Confusion      See below for ways to treat low blood sugar without adding in lots of extra calories.      Treating Low Blood Sugar    If you have symptoms of low blood sugar (sweating, shaking, dizzy, confused) eat 15 grams of carbs and wait 15  minutes:      Glucose Tabs are best for sugars under 70 -  Dex4 or BD Glucose tablets are good, you will need to take 3-4 of these to equal 15 grams.       One small box of raisins    4 oz fruit juice box or   cup fruit juice    1 small apple    1 small banana      cup canned fruit in water      English muffin or a slice of bread with jelly     1 low fat frozen waffle with sugar-free syrup      cup cottage cheese with   cup frozen or fresh blueberries    1 cup skim or low-fat milk      cup whole grain cereal    4-6 crackers such as Triscuits      This medication is usually covered by insurance for patients with a diagnosis of Type 2 Diabetes. Depending on insurance coverage, the medication may be changed to a different formulary, but they all work in the same way. Sometimes a prior authorization is required, which may take up to 1-2 weeks for an insurance company to make a decision if they will cover the medication. Please be patient, you will be notified either way.     Contact the nurse via [employer] or call [phone] if you have any questions or concerns. (Do not stop taking it if you don't think it's working. For some people it works without them knowing it.)     In order to get refills of this or any medication we prescribe you must be seen in the medical weight mgmt clinic every 2-4 months.

## 2022-11-16 ENCOUNTER — TELEPHONE (OUTPATIENT)
Dept: ENDOCRINOLOGY | Facility: CLINIC | Age: 62
End: 2022-11-16

## 2022-11-16 NOTE — TELEPHONE ENCOUNTER
[facility]    Schedule a follow up appt with Dr. Walden in about 3 months (around 1/17/23)

## 2022-11-19 ENCOUNTER — HEALTH MAINTENANCE LETTER (OUTPATIENT)
Age: 62
End: 2022-11-19

## 2023-01-26 ENCOUNTER — LAB (OUTPATIENT)
Dept: LAB | Facility: CLINIC | Age: 63
End: 2023-01-26
Payer: COMMERCIAL

## 2023-01-26 DIAGNOSIS — K74.60 CIRRHOSIS OF LIVER WITHOUT ASCITES, UNSPECIFIED HEPATIC CIRRHOSIS TYPE (H): ICD-10-CM

## 2023-01-26 LAB
ALBUMIN SERPL BCG-MCNC: 4.5 G/DL (ref 3.5–5.2)
ALP SERPL-CCNC: 72 U/L (ref 35–104)
ALT SERPL W P-5'-P-CCNC: 52 U/L (ref 10–35)
ANION GAP SERPL CALCULATED.3IONS-SCNC: 11 MMOL/L (ref 7–15)
AST SERPL W P-5'-P-CCNC: 29 U/L (ref 10–35)
BILIRUB DIRECT SERPL-MCNC: 0.23 MG/DL (ref 0–0.3)
BILIRUB SERPL-MCNC: 0.8 MG/DL
BUN SERPL-MCNC: 13.3 MG/DL (ref 8–23)
CALCIUM SERPL-MCNC: 9.6 MG/DL (ref 8.8–10.2)
CHLORIDE SERPL-SCNC: 103 MMOL/L (ref 98–107)
CREAT SERPL-MCNC: 0.7 MG/DL (ref 0.51–0.95)
DEPRECATED HCO3 PLAS-SCNC: 23 MMOL/L (ref 22–29)
ERYTHROCYTE [DISTWIDTH] IN BLOOD BY AUTOMATED COUNT: 12.8 % (ref 10–15)
GFR SERPL CREATININE-BSD FRML MDRD: >90 ML/MIN/1.73M2
GLUCOSE SERPL-MCNC: 275 MG/DL (ref 70–99)
HCT VFR BLD AUTO: 42.2 % (ref 35–47)
HGB BLD-MCNC: 14.3 G/DL (ref 11.7–15.7)
INR PPP: 1.12 (ref 0.85–1.15)
MCH RBC QN AUTO: 31.6 PG (ref 26.5–33)
MCHC RBC AUTO-ENTMCNC: 33.9 G/DL (ref 31.5–36.5)
MCV RBC AUTO: 93 FL (ref 78–100)
PLATELET # BLD AUTO: 131 10E3/UL (ref 150–450)
POTASSIUM SERPL-SCNC: 4.2 MMOL/L (ref 3.4–5.3)
PROT SERPL-MCNC: 7.5 G/DL (ref 6.4–8.3)
RBC # BLD AUTO: 4.52 10E6/UL (ref 3.8–5.2)
SODIUM SERPL-SCNC: 137 MMOL/L (ref 136–145)
WBC # BLD AUTO: 5.5 10E3/UL (ref 4–11)

## 2023-01-26 PROCEDURE — 85027 COMPLETE CBC AUTOMATED: CPT

## 2023-01-26 PROCEDURE — 82248 BILIRUBIN DIRECT: CPT

## 2023-01-26 PROCEDURE — 80053 COMPREHEN METABOLIC PANEL: CPT

## 2023-01-26 PROCEDURE — 36415 COLL VENOUS BLD VENIPUNCTURE: CPT

## 2023-01-26 PROCEDURE — 85610 PROTHROMBIN TIME: CPT

## 2023-01-30 NOTE — RESULT ENCOUNTER NOTE
Ms. Shepard:    Your liver tests and CBC were stable on this most recent blood drawn. Your glucose level was elevated (275).    We will plan on seeing you in clinic tomorrow.    If you have any questions about your liver disease care or tests, you can call the clinic at [phone].     - Dr. Carranza

## 2023-01-31 ENCOUNTER — OFFICE VISIT (OUTPATIENT)
Dept: GASTROENTEROLOGY | Facility: CLINIC | Age: 63
End: 2023-01-31
Payer: COMMERCIAL

## 2023-01-31 VITALS — SYSTOLIC BLOOD PRESSURE: 150 MMHG | OXYGEN SATURATION: 97 % | HEART RATE: 83 BPM | DIASTOLIC BLOOD PRESSURE: 77 MMHG

## 2023-01-31 DIAGNOSIS — K74.60 CIRRHOSIS OF LIVER WITHOUT ASCITES, UNSPECIFIED HEPATIC CIRRHOSIS TYPE (H): Primary | ICD-10-CM

## 2023-01-31 DIAGNOSIS — K42.9 PERIUMBILICAL HERNIA: ICD-10-CM

## 2023-01-31 PROCEDURE — 99213 OFFICE O/P EST LOW 20 MIN: CPT | Performed by: INTERNAL MEDICINE

## 2023-01-31 ASSESSMENT — PAIN SCALES - GENERAL: PAINLEVEL: MODERATE PAIN (5)

## 2023-01-31 NOTE — LETTER
2023         RE: Nina Shepard  [address]  [place] [address]        Dear Colleague,    Thank you for referring your patient, Nina Shepard, to the [facility] [place]. Please see a copy of my visit note below.    [facility]       Hepatology Clinic    Date of Service: 2023         History of Present Illness     Ms. Shepard presents for follow-up of WARREN cirrhosis.  Please see her previous hepatology clinic notes.    She has seen Dr. Walden and was started on semaglutide in the last few weeks.  She will continue to follow in the clinic with dosage adjustments as needed.  She reports no alcohol use, and that she has been adhering to dietary and exercise recommendations.    She has occasional right flank pain but otherwise no hepatic or GI symptoms.  She does note some fatigue.      Past Medical History:  Past Medical History:   Diagnosis Date     Bell's palsy      Cancer (H)      Diabetes (H)      Heart disease      Hypertension      WARREN (nonalcoholic steatohepatitis)      PONV (postoperative nausea and vomiting)      Thyroid disease        Patient Active Problem List   Diagnosis     Bell's paralysis     Cirrhosis of liver without ascites (H)     Coronary atherosclerosis     Essential hypertension     Generalized anxiety disorder     Hypothyroidism     Major depressive disorder, recurrent episode, in partial remission (H)     Mixed hyperlipidemia     Psoriatic arthropathy (H)     Type II diabetes mellitus (H)       Surgical History:  Past Surgical History:   Procedure Laterality Date     ABDOMINAL ADHESION SURGERY       APPENDECTOMY       CARDIAC SURGERY        SECTION       COLONOSCOPY N/A 2022    Procedure: COLONOSCOPY;  Surgeon: Rogerio Carranza MD;  Location:  GI     ENT SURGERY       ESOPHAGOSCOPY, GASTROSCOPY, DUODENOSCOPY (EGD), COMBINED N/A 2022    Procedure: ESOPHAGOGASTRODUODENOSCOPY (EGD);   Surgeon: Rogerio Carranza MD;  Location:  GI     HYSTERECTOMY       MAMMOPLASTY AUGMENTATION Bilateral 12/30/2016    Procedure: BILATERAL BREAST IMPLANT EXCHANGE;  Surgeon: Ermias Barakat MD;  Location: [facility];  Service:      MASTECTOMY Bilateral      TONSILLECTOMY         Social History:  Social History     Tobacco Use     Smoking status: Never     Smokeless tobacco: Never   Substance Use Topics     Alcohol use: No     Drug use: No       Family History:  Family History   Problem Relation Age of Onset     Breast Cancer Mother      Coronary Artery Disease Father        Medications:  Current Outpatient Medications   Medication     ammonium lactate (LAC-HYDRIN) 12 % external lotion     Apremilast (OTEZLA) 10 & 20 & 30 MG TBPK     artificial tears (SOOTHE NIGHT TIME) ophthalmic ointment     atorvastatin (LIPITOR) 20 MG tablet     bisacodyl (DULCOLAX) 5 MG EC tablet     calcium carbonate-vitamin D (OS-ABDON WITH D) 500-200 MG-UNIT tablet     carboxymethylcellulose (REFRESH PLUS) 0.5 % SOLN ophthalmic solution     conjugated estrogens (PREMARIN) 0.625 MG/GM vaginal cream     cyclobenzaprine (FLEXERIL) 10 MG tablet     cyclobenzaprine (FLEXERIL) 5 MG tablet     hypromellose (GENTEAL) 0.3 % SOLN ophthalmic solution     insulin aspart (NOVOLOG PEN) 100 UNIT/ML pen     insulin glargine (LANTUS VIAL) 100 UNIT/ML vial     INVOKANA 300 MG tablet     JARDIANCE 25 MG TABS tablet     levothyroxine (SYNTHROID/LEVOTHROID) 100 MCG tablet     LORazepam (ATIVAN) 1 MG tablet     meclizine (ANTIVERT) 25 MG tablet     metoprolol succinate ER (TOPROL XL) 25 MG 24 hr tablet     Multiple Vitamin (MULTIVITAMIN ADULT PO)     NONFORMULARY     NONFORMULARY     ondansetron (ZOFRAN) 4 MG tablet     polyethylene glycol (GOLYTELY) 236 g suspension     polyethylene glycol-propylene glycol (SYSTANE ULTRA) 0.4-0.3 % SOLN ophthalmic solution     semaglutide (OZEMPIC) 2 MG/1.5ML SOPN pen     valsartan (DIOVAN) 40 MG tablet     zolpidem  (AMBIEN) 5 MG tablet     valACYclovir (VALTREX) 1000 mg tablet     No current facility-administered medications for this visit.           Objective:         Vitals:    01/31/23 1619   BP: (!) 150/77   Pulse: 83   SpO2: 97%     There is no height or weight on file to calculate BMI.     Physical Exam  Constitutional: Well-developed, well-nourished, in no apparent distress.    HEENT: Normocephalic.  No scleral icterus. .   Musculoskeletal:  ROM intact, apparently normal muscle bulk    Psychiatric: Normal mood and affect. Behavior is normal.  Neuro: No asterixis    Labs and Diagnostic tests:  Lab Results   Component Value Date     01/26/2023     09/15/2020    POTASSIUM 4.2 01/26/2023    POTASSIUM 4.3 08/09/2022    POTASSIUM 3.9 09/15/2020    CHLORIDE 103 01/26/2023    CHLORIDE 106 08/09/2022    CHLORIDE 108 09/15/2020    CO2 23 01/26/2023    CO2 22 08/09/2022    CO2 20 09/15/2020    BUN 13.3 01/26/2023    BUN 15 08/09/2022    BUN 18 09/15/2020    CR 0.70 01/26/2023    CR 0.70 09/15/2020     Lab Results   Component Value Date    BILITOTAL 0.8 01/26/2023    ALT 52 01/26/2023    AST 29 01/26/2023    ALKPHOS 72 01/26/2023     Lab Results   Component Value Date    ALBUMIN 4.5 01/26/2023    ALBUMIN 4.1 08/09/2022    PROTTOTAL 7.5 01/26/2023      Lab Results   Component Value Date    WBC 5.5 01/26/2023    WBC 6.2 09/15/2020    HGB 14.3 01/26/2023    HGB 14.6 09/15/2020    MCV 93 01/26/2023    MCV 93 09/15/2020     01/26/2023     09/15/2020     Lab Results   Component Value Date    INR 1.12 01/26/2023    INR 1.05 11/25/2008       MELD-Na score: 7 at 1/26/2023  2:30 PM  MELD score: 7 at 1/26/2023  2:30 PM  Calculated from:  Serum Creatinine: 0.70 mg/dL (Using min of 1 mg/dL) at 1/26/2023  2:30 PM  Serum Sodium: 137 mmol/L at 1/26/2023  2:30 PM  Total Bilirubin: 0.8 mg/dL (Using min of 1 mg/dL) at 1/26/2023  2:30 PM  INR(ratio): 1.12 at 1/26/2023  2:30 PM  Age: 62 years        Assessment and Plan:    1.   WARREN cirrhosis.  We had her liver biopsy reviewed by Dr. Rosenthal at the [facility], who agrees that this supports a diagnosis of WARREN.  She is working with the endocrinology service to titrate up the semaglutide and other measures to control weight and diabetes (although she is not significantly obese).  She is very motivated to be compliant with these recommendations.  She should continue to work with her primary care providers to manage hypertension and other manifestations of the metabolic syndrome.    This patient up appears to have very aggressive fatty liver disease.  I explained that we do not know all of the factors that promote disease progression in some patients, and that there are no clearly effective therapies once the patient developed significant fibrosis.  However, with future developments, we hope to have better therapies in the future.  For now, I am is suggesting continued surveillance with ultrasound every 6 months (I have ordered these).  I have also asked the hepatology RNCC to obtain labs in 6 and 12 months.  I will tentatively plan on seeing her back in 1 year, but she can return sooner as needed.          About 27 minutes spent today with patient, reviewing results, and coordinating care.    This note was created with voice recognition software, and while reviewed for accuracy, typos may remain.        Rogerio Carranza MD  Professor of Medicine  [facility]  Division of Gastroenterology, Hepatology, and Nutrition      Again, thank you for allowing me to participate in the care of your patient.        Sincerely,        Rogerio Carranza MD

## 2023-01-31 NOTE — PATIENT INSTRUCTIONS
Recommendations:    Continue to work with your providers in the diabetes and weight management clinic.  Ultrasound every 6 months to screen for liver cancer. I have ordered on for early March and September.    I am suggesting that you return Hepatology Clinic in one year, but you can return sooner if you have worsening Liver or GI symptoms or concerns.    If you have any questions about your liver disease care or tests, you can call the clinic at [phone].

## 2023-01-31 NOTE — PROGRESS NOTES
[facility]       Hepatology Clinic    Date of Service: 2023         History of Present Illness     Ms. Shepard presents for follow-up of WARREN cirrhosis.  Please see her previous hepatology clinic notes.    She has seen Dr. Walden and was started on semaglutide in the last few weeks.  She will continue to follow in the clinic with dosage adjustments as needed.  She reports no alcohol use, and that she has been adhering to dietary and exercise recommendations.    She has occasional right flank pain but otherwise no hepatic or GI symptoms.  She does note some fatigue.      Past Medical History:  Past Medical History:   Diagnosis Date     Bell's palsy      Cancer (H)      Diabetes (H)      Heart disease      Hypertension      WARREN (nonalcoholic steatohepatitis)      PONV (postoperative nausea and vomiting)      Thyroid disease        Patient Active Problem List   Diagnosis     Bell's paralysis     Cirrhosis of liver without ascites (H)     Coronary atherosclerosis     Essential hypertension     Generalized anxiety disorder     Hypothyroidism     Major depressive disorder, recurrent episode, in partial remission (H)     Mixed hyperlipidemia     Psoriatic arthropathy (H)     Type II diabetes mellitus (H)       Surgical History:  Past Surgical History:   Procedure Laterality Date     ABDOMINAL ADHESION SURGERY       APPENDECTOMY       CARDIAC SURGERY        SECTION       COLONOSCOPY N/A 2022    Procedure: COLONOSCOPY;  Surgeon: Rogerio Carranza MD;  Location:  GI     ENT SURGERY       ESOPHAGOSCOPY, GASTROSCOPY, DUODENOSCOPY (EGD), COMBINED N/A 2022    Procedure: ESOPHAGOGASTRODUODENOSCOPY (EGD);  Surgeon: Rogerio Carranza MD;  Location:  GI     HYSTERECTOMY       MAMMOPLASTY AUGMENTATION Bilateral 2016    Procedure: BILATERAL BREAST IMPLANT EXCHANGE;  Surgeon: Ermias Barakat MD;  Location: [facility];  Service:      MASTECTOMY  Bilateral      TONSILLECTOMY         Social History:  Social History     Tobacco Use     Smoking status: Never     Smokeless tobacco: Never   Substance Use Topics     Alcohol use: No     Drug use: No       Family History:  Family History   Problem Relation Age of Onset     Breast Cancer Mother      Coronary Artery Disease Father        Medications:  Current Outpatient Medications   Medication     ammonium lactate (LAC-HYDRIN) 12 % external lotion     Apremilast (OTEZLA) 10 & 20 & 30 MG TBPK     artificial tears (SOOTHE NIGHT TIME) ophthalmic ointment     atorvastatin (LIPITOR) 20 MG tablet     bisacodyl (DULCOLAX) 5 MG EC tablet     calcium carbonate-vitamin D (OS-ABDON WITH D) 500-200 MG-UNIT tablet     carboxymethylcellulose (REFRESH PLUS) 0.5 % SOLN ophthalmic solution     conjugated estrogens (PREMARIN) 0.625 MG/GM vaginal cream     cyclobenzaprine (FLEXERIL) 10 MG tablet     cyclobenzaprine (FLEXERIL) 5 MG tablet     hypromellose (GENTEAL) 0.3 % SOLN ophthalmic solution     insulin aspart (NOVOLOG PEN) 100 UNIT/ML pen     insulin glargine (LANTUS VIAL) 100 UNIT/ML vial     INVOKANA 300 MG tablet     JARDIANCE 25 MG TABS tablet     levothyroxine (SYNTHROID/LEVOTHROID) 100 MCG tablet     LORazepam (ATIVAN) 1 MG tablet     meclizine (ANTIVERT) 25 MG tablet     metoprolol succinate ER (TOPROL XL) 25 MG 24 hr tablet     Multiple Vitamin (MULTIVITAMIN ADULT PO)     NONFORMULARY     NONFORMULARY     ondansetron (ZOFRAN) 4 MG tablet     polyethylene glycol (GOLYTELY) 236 g suspension     polyethylene glycol-propylene glycol (SYSTANE ULTRA) 0.4-0.3 % SOLN ophthalmic solution     semaglutide (OZEMPIC) 2 MG/1.5ML SOPN pen     valsartan (DIOVAN) 40 MG tablet     zolpidem (AMBIEN) 5 MG tablet     valACYclovir (VALTREX) 1000 mg tablet     No current facility-administered medications for this visit.           Objective:         Vitals:    01/31/23 1619   BP: (!) 150/77   Pulse: 83   SpO2: 97%     There is no height or weight on  file to calculate BMI.     Physical Exam  Constitutional: Well-developed, well-nourished, in no apparent distress.    HEENT: Normocephalic.  No scleral icterus. .   Musculoskeletal:  ROM intact, apparently normal muscle bulk    Psychiatric: Normal mood and affect. Behavior is normal.  Neuro: No asterixis    Labs and Diagnostic tests:  Lab Results   Component Value Date     01/26/2023     09/15/2020    POTASSIUM 4.2 01/26/2023    POTASSIUM 4.3 08/09/2022    POTASSIUM 3.9 09/15/2020    CHLORIDE 103 01/26/2023    CHLORIDE 106 08/09/2022    CHLORIDE 108 09/15/2020    CO2 23 01/26/2023    CO2 22 08/09/2022    CO2 20 09/15/2020    BUN 13.3 01/26/2023    BUN 15 08/09/2022    BUN 18 09/15/2020    CR 0.70 01/26/2023    CR 0.70 09/15/2020     Lab Results   Component Value Date    BILITOTAL 0.8 01/26/2023    ALT 52 01/26/2023    AST 29 01/26/2023    ALKPHOS 72 01/26/2023     Lab Results   Component Value Date    ALBUMIN 4.5 01/26/2023    ALBUMIN 4.1 08/09/2022    PROTTOTAL 7.5 01/26/2023      Lab Results   Component Value Date    WBC 5.5 01/26/2023    WBC 6.2 09/15/2020    HGB 14.3 01/26/2023    HGB 14.6 09/15/2020    MCV 93 01/26/2023    MCV 93 09/15/2020     01/26/2023     09/15/2020     Lab Results   Component Value Date    INR 1.12 01/26/2023    INR 1.05 11/25/2008       MELD-Na score: 7 at 1/26/2023  2:30 PM  MELD score: 7 at 1/26/2023  2:30 PM  Calculated from:  Serum Creatinine: 0.70 mg/dL (Using min of 1 mg/dL) at 1/26/2023  2:30 PM  Serum Sodium: 137 mmol/L at 1/26/2023  2:30 PM  Total Bilirubin: 0.8 mg/dL (Using min of 1 mg/dL) at 1/26/2023  2:30 PM  INR(ratio): 1.12 at 1/26/2023  2:30 PM  Age: 62 years        Assessment and Plan:    1.  WARREN cirrhosis.  We had her liver biopsy reviewed by Dr. Rosenthal at the [facility], who agrees that this supports a diagnosis of WARREN.  She is working with the endocrinology service to titrate up the semaglutide and other measures to control weight and  diabetes (although she is not significantly obese).  She is very motivated to be compliant with these recommendations.  She should continue to work with her primary care providers to manage hypertension and other manifestations of the metabolic syndrome.    This patient up appears to have very aggressive fatty liver disease.  I explained that we do not know all of the factors that promote disease progression in some patients, and that there are no clearly effective therapies once the patient developed significant fibrosis.  However, with future developments, we hope to have better therapies in the future.  For now, I am is suggesting continued surveillance with ultrasound every 6 months (I have ordered these).  I have also asked the hepatology RNCC to obtain labs in 6 and 12 months.  I will tentatively plan on seeing her back in 1 year, but she can return sooner as needed.          About 27 minutes spent today with patient, reviewing results, and coordinating care.    This note was created with voice recognition software, and while reviewed for accuracy, typos may remain.        Rogerio Carranza MD  Professor of Medicine  [facility]  Division of Gastroenterology, Hepatology, and Nutrition

## 2023-02-02 DIAGNOSIS — K74.60 CIRRHOSIS OF LIVER WITHOUT ASCITES, UNSPECIFIED HEPATIC CIRRHOSIS TYPE (H): Primary | ICD-10-CM

## 2023-02-11 DIAGNOSIS — Z79.4 TYPE 2 DIABETES MELLITUS WITH HYPERGLYCEMIA, WITH LONG-TERM CURRENT USE OF INSULIN (H): ICD-10-CM

## 2023-02-11 DIAGNOSIS — E11.65 TYPE 2 DIABETES MELLITUS WITH HYPERGLYCEMIA, WITH LONG-TERM CURRENT USE OF INSULIN (H): ICD-10-CM

## 2023-02-15 NOTE — TELEPHONE ENCOUNTER
Ozempic (0.25 or 0.5 MG/DOSE) 2 MG/1.5ML Subcutaneous Solution Pen-injector      Last Written Prescription Date:  11/10/22  Last Fill Quantity: 1.5ml,   # refills: 1  Last Office Visit : 10/17/22  Future Office visit:  3/20/23    Routing refill request to provider for review/approval because:  Overdue for A1c - has dx of Type II DM  Need updated order reflecting dose increase - pt should be taking 0.5mg once a week?

## 2023-02-16 RX ORDER — SEMAGLUTIDE 1.34 MG/ML
0.5 INJECTION, SOLUTION SUBCUTANEOUS
Qty: 1.5 ML | Refills: 0 | Status: SHIPPED | OUTPATIENT
Start: 2023-02-16 | End: 2023-03-13

## 2023-02-16 NOTE — TELEPHONE ENCOUNTER
Appointment scheduled with Dr. Walden next month. Patient should be taking 0.5mg weekly dose now. Routed to RN for sign off.

## 2023-03-09 DIAGNOSIS — Z79.4 TYPE 2 DIABETES MELLITUS WITH HYPERGLYCEMIA, WITH LONG-TERM CURRENT USE OF INSULIN (H): ICD-10-CM

## 2023-03-09 DIAGNOSIS — E11.65 TYPE 2 DIABETES MELLITUS WITH HYPERGLYCEMIA, WITH LONG-TERM CURRENT USE OF INSULIN (H): ICD-10-CM

## 2023-03-13 RX ORDER — SEMAGLUTIDE 1.34 MG/ML
INJECTION, SOLUTION SUBCUTANEOUS
Qty: 2 ML | Refills: 0 | Status: SHIPPED | OUTPATIENT
Start: 2023-03-13 | End: 2023-03-20 | Stop reason: DRUGHIGH

## 2023-03-13 NOTE — TELEPHONE ENCOUNTER
Ozempic (0.25 or 0.5 MG/DOSE) 2 MG/1.5ML Subcutaneous Solution Pen-injector      Last Written Prescription Date:  2/16/23  Last Fill Quantity: 1.5ml,   # refills: 0  Last Office Visit : 10/17/22  Future Office visit:  3/20/22    Routing refill request to provider for review/approval because:  Overdue for A1c

## 2023-03-20 ENCOUNTER — OFFICE VISIT (OUTPATIENT)
Dept: ENDOCRINOLOGY | Facility: CLINIC | Age: 63
End: 2023-03-20
Payer: COMMERCIAL

## 2023-03-20 ENCOUNTER — OFFICE VISIT (OUTPATIENT)
Dept: SURGERY | Facility: CLINIC | Age: 63
End: 2023-03-20
Payer: COMMERCIAL

## 2023-03-20 VITALS
SYSTOLIC BLOOD PRESSURE: 118 MMHG | HEIGHT: 66 IN | HEART RATE: 74 BPM | BODY MASS INDEX: 24.75 KG/M2 | WEIGHT: 154 LBS | OXYGEN SATURATION: 98 % | DIASTOLIC BLOOD PRESSURE: 77 MMHG

## 2023-03-20 VITALS
WEIGHT: 154 LBS | HEIGHT: 66 IN | SYSTOLIC BLOOD PRESSURE: 118 MMHG | BODY MASS INDEX: 24.75 KG/M2 | DIASTOLIC BLOOD PRESSURE: 77 MMHG

## 2023-03-20 DIAGNOSIS — E11.65 TYPE 2 DIABETES MELLITUS WITH HYPERGLYCEMIA, WITH LONG-TERM CURRENT USE OF INSULIN (H): ICD-10-CM

## 2023-03-20 DIAGNOSIS — Z79.4 TYPE 2 DIABETES MELLITUS WITH HYPERGLYCEMIA, WITH LONG-TERM CURRENT USE OF INSULIN (H): ICD-10-CM

## 2023-03-20 DIAGNOSIS — K42.9 UMBILICAL HERNIA WITHOUT OBSTRUCTION AND WITHOUT GANGRENE: Primary | ICD-10-CM

## 2023-03-20 PROCEDURE — 99213 OFFICE O/P EST LOW 20 MIN: CPT | Performed by: INTERNAL MEDICINE

## 2023-03-20 PROCEDURE — 99204 OFFICE O/P NEW MOD 45 MIN: CPT | Performed by: SURGERY

## 2023-03-20 ASSESSMENT — PAIN SCALES - GENERAL: PAINLEVEL: SEVERE PAIN (6)

## 2023-03-20 NOTE — NURSING NOTE
"Chief Complaint   Patient presents with     New Patient     Umbilical hernia       Vitals:    03/20/23 1322   BP: 118/77   Weight: 69.9 kg (154 lb)   Height: 1.676 m (5' 6\")       Body mass index is 24.86 kg/m .                          Yonis Melo, EMT      "

## 2023-03-20 NOTE — PATIENT INSTRUCTIONS
You met with Dr. Willie Munson.      Today's visit instructions:    Please call us at the Nurse Advice line listed below if you decide to proceed with surgery to repair your hernia with Dr. Munson.       If you have questions please contact Virginia RN or Ida RN during regular clinic hours, Monday through Friday 7:30 AM - 4:00 PM, or you can contact us via [employer] at anytime.       If you have urgent needs after-hours, weekends, or holidays please call the hospital at [phone] and ask to speak with our on-call General Surgery Team.    Appointment [phone]  Nurse Advice (Virginia or Ida): [phone]   Surgery Scheduler (Jerri): [phone]  Fax: [phone]

## 2023-03-20 NOTE — LETTER
"3/20/2023       RE: Nina Shepard  [address]  [place] [address]     Dear Colleague,    Thank you for referring your patient, Nina Shepard, to the [facility] [place] at [facility]. Please see a copy of my visit note below.        Return Medical Weight Management Note     Nina Shepard  MRN:  [id number]  :  1960  TOMMY:  3/20/2023    Dear [facility],    I had the pleasure of seeing your patient Nina Shepard.  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  Vitals:  B/P: 118/77, P: 74    Initial consult weight was 158 on 10/17/22.  Weight change since last seen on 10/17/2022 is down 4 pounds.   Total loss is 4 pounds.    INTERVAL HISTORY:  After struggle, she is now on semaglutide and tolerating. Found to have adequate c-peptide (7) and BERONICA ab negative.     No flowsheet data found.    MEDICATIONS:   Current Outpatient Medications   Medication     ammonium lactate (LAC-HYDRIN) 12 % external lotion     Apremilast (OTEZLA) 10 & 20 & 30 MG TBPK     artificial tears (SOOTHE NIGHT TIME) ophthalmic ointment     atorvastatin (LIPITOR) 20 MG tablet     bisacodyl (DULCOLAX) 5 MG EC tablet     calcium carbonate-vitamin D (OS-ABDON WITH D) 500-200 MG-UNIT tablet     carboxymethylcellulose (REFRESH PLUS) 0.5 % SOLN " ophthalmic solution     conjugated estrogens (PREMARIN) 0.625 MG/GM vaginal cream     cyclobenzaprine (FLEXERIL) 10 MG tablet     cyclobenzaprine (FLEXERIL) 5 MG tablet     hypromellose (GENTEAL) 0.3 % SOLN ophthalmic solution     insulin aspart (NOVOLOG PEN) 100 UNIT/ML pen     insulin glargine (LANTUS VIAL) 100 UNIT/ML vial     INVOKANA 300 MG tablet     JARDIANCE 25 MG TABS tablet     levothyroxine (SYNTHROID/LEVOTHROID) 100 MCG tablet     LORazepam (ATIVAN) 1 MG tablet     meclizine (ANTIVERT) 25 MG tablet     metoprolol succinate ER (TOPROL XL) 25 MG 24 hr tablet     Multiple Vitamin (MULTIVITAMIN ADULT PO)     NONFORMULARY     NONFORMULARY     ondansetron (ZOFRAN) 4 MG tablet     OZEMPIC, 0.25 OR 0.5 MG/DOSE, 2 MG/1.5ML SOPN pen     polyethylene glycol (GOLYTELY) 236 g suspension     polyethylene glycol-propylene glycol (SYSTANE ULTRA) 0.4-0.3 % SOLN ophthalmic solution     valsartan (DIOVAN) 40 MG tablet     zolpidem (AMBIEN) 5 MG tablet     valACYclovir (VALTREX) 1000 mg tablet     No current facility-administered medications for this visit.     Weight Loss Medication History Reviewed With Patient 3/20/2023   Which weight loss medications are you currently taking on a regular basis?  Ozempic   Are you having any side effects from the weight loss medication that we have prescribed you? No      ASSESSMENT:   Increase semaglutide to 1 mg/w for hope benefit on insulin dosing and liver fat. Despite struggles may consider tirzepatide for greater effect.    FOLLOW-UP:    12 weeks.    Sincerely,  Willie Walden MD

## 2023-03-20 NOTE — PROGRESS NOTES
"    Return Medical Weight Management Note     Nina Shepard  MRN:  [id number]  :  1960  TOMMY:  3/20/2023    Dear [facility],    I had the pleasure of seeing your patient Nina Shepard.  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  Vitals:  B/P: 118/77, P: 74    Initial consult weight was 158 on 10/17/22.  Weight change since last seen on 10/17/2022 is down 4 pounds.   Total loss is 4 pounds.    INTERVAL HISTORY:  After struggle, she is now on semaglutide and tolerating. Found to have adequate c-peptide (7) and BERONICA ab negative.     No flowsheet data found.    MEDICATIONS:   Current Outpatient Medications   Medication     ammonium lactate (LAC-HYDRIN) 12 % external lotion     Apremilast (OTEZLA) 10 & 20 & 30 MG TBPK     artificial tears (SOOTHE NIGHT TIME) ophthalmic ointment     atorvastatin (LIPITOR) 20 MG tablet     bisacodyl (DULCOLAX) 5 MG EC tablet     calcium carbonate-vitamin D (OS-ABDON WITH D) 500-200 MG-UNIT tablet     carboxymethylcellulose (REFRESH PLUS) 0.5 % SOLN ophthalmic solution     conjugated estrogens (PREMARIN) 0.625 MG/GM vaginal cream     cyclobenzaprine (FLEXERIL) 10 MG tablet     cyclobenzaprine (FLEXERIL) 5 MG tablet     hypromellose (GENTEAL) 0.3 % SOLN ophthalmic solution     insulin aspart (NOVOLOG PEN) 100 UNIT/ML pen     insulin glargine (LANTUS VIAL) 100 UNIT/ML " vial     INVOKANA 300 MG tablet     JARDIANCE 25 MG TABS tablet     levothyroxine (SYNTHROID/LEVOTHROID) 100 MCG tablet     LORazepam (ATIVAN) 1 MG tablet     meclizine (ANTIVERT) 25 MG tablet     metoprolol succinate ER (TOPROL XL) 25 MG 24 hr tablet     Multiple Vitamin (MULTIVITAMIN ADULT PO)     NONFORMULARY     NONFORMULARY     ondansetron (ZOFRAN) 4 MG tablet     OZEMPIC, 0.25 OR 0.5 MG/DOSE, 2 MG/1.5ML SOPN pen     polyethylene glycol (GOLYTELY) 236 g suspension     polyethylene glycol-propylene glycol (SYSTANE ULTRA) 0.4-0.3 % SOLN ophthalmic solution     valsartan (DIOVAN) 40 MG tablet     zolpidem (AMBIEN) 5 MG tablet     valACYclovir (VALTREX) 1000 mg tablet     No current facility-administered medications for this visit.     Weight Loss Medication History Reviewed With Patient 3/20/2023   Which weight loss medications are you currently taking on a regular basis?  Ozempic   Are you having any side effects from the weight loss medication that we have prescribed you? No      ASSESSMENT:   Increase semaglutide to 1 mg/w for hope benefit on insulin dosing and liver fat. Despite struggles may consider tirzepatide for greater effect.    FOLLOW-UP:    12 weeks.    Sincerely,  Willie Walden MD

## 2023-03-20 NOTE — LETTER
3/20/2023       RE: Nina Shepard  [address]  [place] [address]     Dear Colleague,    Thank you for referring your patient, Nina Shepard, to the [facility] GENERAL SURGERY CLINIC [place] at [facility]. Please see a copy of my visit note below.    Nina Shepard is a 62 year old female with a long history of an umbilical hernia that has been previously repaired twice. No significant symptoms.     Obstructive symptoms:  no  Urinary difficulties:  occasionally  Chronic cough: no  Constipation:  no  Current level of activity:  Active at home.    Past medical history, medications, allergies, family history, and social history were reviewed with the patient. Liver disease. Also considering advanced gyn surgery.     Past Medical History:   Diagnosis Date     Bell's palsy      Cancer (H)      Diabetes (H)      Heart disease      Hypertension      WARREN (nonalcoholic steatohepatitis)      PONV (postoperative nausea and vomiting)      Thyroid disease      Past Surgical History:   Procedure Laterality Date     ABDOMINAL ADHESION SURGERY       APPENDECTOMY       CARDIAC SURGERY        SECTION       COLONOSCOPY N/A 2022    Procedure: COLONOSCOPY;  Surgeon: Rogerio Carranza MD;  Location:  GI     ENT SURGERY       ESOPHAGOSCOPY, GASTROSCOPY, DUODENOSCOPY (EGD), COMBINED N/A 2022    Procedure: ESOPHAGOGASTRODUODENOSCOPY (EGD);  Surgeon: Rogerio Carranza MD;  Location:  GI     HYSTERECTOMY       MAMMOPLASTY AUGMENTATION Bilateral 2016    Procedure: BILATERAL BREAST IMPLANT EXCHANGE;  Surgeon: Ermias Barakat MD;  Location: [facility];  Service:      MASTECTOMY Bilateral      TONSILLECTOMY     ROS: 10 point review of systems negative except noted in HPI    CT reviewed 22  IMPRESSION:   1. Hepatomegaly with steatosis and features of cirrhosis with portal  hypertension including splenomegaly and recanalized  "paraumbilical  vein.  2. Noncomplicated fat-containing umbilical hernia.  3. Pelvic floor laxity with caudal descent of the rectum, bladder  neck, and vaginal apex.     PHYSICAL EXAM  General appearance-  alert, and in no distress.  Skin- Skin color and turgor normal.  No obvious rashes.  Neck- Neck is supple without obvious adenopathy.  Lungs- Respiratory effort unlabored.  Gait- Normal.  Abdomen - well healed surgical scars, small umbilical hernia    Impression: Umbilical hernia in cirrhosis patient.  Today reviewed risks goals and potential complications for elective repair of this relatively asymptomatic umbilical hernia.  Understands she is at increased risk for perioperative complications and that recurrence rate is high.  At this point would like to hold off on surgery. Will contact us should she wish to proceed.    \"Total time = 30 minutes, spent on the date of encounter doing chart review, history and physical, documentation, patient education, and any further activity as noted above.     Sincerely,    Willie Munson MD  "

## 2023-03-20 NOTE — NURSING NOTE
"Chief Complaint   Patient presents with     RECHECK     Nina, is being seen today for a RTN MWM.       Vitals:    03/20/23 1148   BP: 118/77   BP Location: Left arm   Patient Position: Chair   Cuff Size: Adult Regular   Pulse: 74   SpO2: 98%   Weight: 69.9 kg (154 lb)   Height: 1.676 m (5' 6\")       Body mass index is 24.86 kg/m .      Edie Greenwood LPN    "

## 2023-03-22 ENCOUNTER — ANCILLARY PROCEDURE (OUTPATIENT)
Dept: ULTRASOUND IMAGING | Facility: CLINIC | Age: 63
End: 2023-03-22
Attending: INTERNAL MEDICINE
Payer: COMMERCIAL

## 2023-03-22 DIAGNOSIS — K42.9 PERIUMBILICAL HERNIA: ICD-10-CM

## 2023-03-22 DIAGNOSIS — K74.60 CIRRHOSIS OF LIVER WITHOUT ASCITES, UNSPECIFIED HEPATIC CIRRHOSIS TYPE (H): ICD-10-CM

## 2023-03-22 PROCEDURE — 76705 ECHO EXAM OF ABDOMEN: CPT | Mod: GC | Performed by: RADIOLOGY

## 2023-03-28 NOTE — RESULT ENCOUNTER NOTE
Ms. Shepard:    This ultrasound did not show any new liver abnormalities. As we have discussed, I suggest that you continue to have a liver ultrasound every 6 months.    If you have any questions about your liver disease care or tests, you can call the clinic at [phone].     Best wishes,    Dr. Carranza

## 2023-03-29 ENCOUNTER — TELEPHONE (OUTPATIENT)
Dept: ENDOCRINOLOGY | Facility: CLINIC | Age: 63
End: 2023-03-29
Payer: COMMERCIAL

## 2023-04-09 ENCOUNTER — HEALTH MAINTENANCE LETTER (OUTPATIENT)
Age: 63
End: 2023-04-09

## 2023-06-05 ENCOUNTER — MYC MEDICAL ADVICE (OUTPATIENT)
Dept: ENDOCRINOLOGY | Facility: CLINIC | Age: 63
End: 2023-06-05

## 2023-06-05 DIAGNOSIS — Z79.4 TYPE 2 DIABETES MELLITUS WITH HYPERGLYCEMIA, WITH LONG-TERM CURRENT USE OF INSULIN (H): Primary | ICD-10-CM

## 2023-06-05 DIAGNOSIS — E11.65 TYPE 2 DIABETES MELLITUS WITH HYPERGLYCEMIA, WITH LONG-TERM CURRENT USE OF INSULIN (H): Primary | ICD-10-CM

## 2023-07-03 ENCOUNTER — TELEPHONE (OUTPATIENT)
Dept: GASTROENTEROLOGY | Facility: CLINIC | Age: 63
End: 2023-07-03
Payer: COMMERCIAL

## 2023-07-03 NOTE — TELEPHONE ENCOUNTER
LVM & sent mychart // pt needs to complete labs as soon as they are able - ordered by Dr. Carranza // first attempt, AN 7.3.23

## 2023-07-18 ENCOUNTER — HOSPITAL ENCOUNTER (OUTPATIENT)
Dept: ULTRASOUND IMAGING | Facility: CLINIC | Age: 63
Discharge: HOME | End: 2023-07-18
Attending: INTERNAL MEDICINE
Payer: COMMERCIAL

## 2023-07-18 ENCOUNTER — LAB (OUTPATIENT)
Dept: LAB | Facility: CLINIC | Age: 63
End: 2023-07-18
Attending: INTERNAL MEDICINE
Payer: COMMERCIAL

## 2023-07-18 DIAGNOSIS — K74.60 CIRRHOSIS OF LIVER WITHOUT ASCITES, UNSPECIFIED HEPATIC CIRRHOSIS TYPE (H): ICD-10-CM

## 2023-07-18 LAB
ALBUMIN SERPL BCG-MCNC: 4.7 G/DL (ref 3.5–5.2)
ALP SERPL-CCNC: 63 U/L (ref 35–104)
ALT SERPL W P-5'-P-CCNC: 57 U/L (ref 0–50)
ANION GAP SERPL CALCULATED.3IONS-SCNC: 11 MMOL/L (ref 7–15)
AST SERPL W P-5'-P-CCNC: 31 U/L (ref 0–45)
BILIRUB DIRECT SERPL-MCNC: 0.22 MG/DL (ref 0–0.3)
BILIRUB SERPL-MCNC: 1 MG/DL
BUN SERPL-MCNC: 16.8 MG/DL (ref 8–23)
CALCIUM SERPL-MCNC: 9.7 MG/DL (ref 8.8–10.2)
CHLORIDE SERPL-SCNC: 103 MMOL/L (ref 98–107)
CREAT SERPL-MCNC: 0.73 MG/DL (ref 0.51–0.95)
DEPRECATED HCO3 PLAS-SCNC: 23 MMOL/L (ref 22–29)
ERYTHROCYTE [DISTWIDTH] IN BLOOD BY AUTOMATED COUNT: 13.1 % (ref 10–15)
GFR SERPL CREATININE-BSD FRML MDRD: >90 ML/MIN/1.73M2
GLUCOSE SERPL-MCNC: 183 MG/DL (ref 70–99)
HCT VFR BLD AUTO: 41.4 % (ref 35–47)
HGB BLD-MCNC: 13.9 G/DL (ref 11.7–15.7)
INR PPP: 1.1 (ref 0.85–1.15)
MCH RBC QN AUTO: 31.3 PG (ref 26.5–33)
MCHC RBC AUTO-ENTMCNC: 33.6 G/DL (ref 31.5–36.5)
MCV RBC AUTO: 93 FL (ref 78–100)
PLATELET # BLD AUTO: 114 10E3/UL (ref 150–450)
POTASSIUM SERPL-SCNC: 4.6 MMOL/L (ref 3.4–5.3)
PROT SERPL-MCNC: 7.8 G/DL (ref 6.4–8.3)
RBC # BLD AUTO: 4.44 10E6/UL (ref 3.8–5.2)
SODIUM SERPL-SCNC: 137 MMOL/L (ref 136–145)
WBC # BLD AUTO: 4.9 10E3/UL (ref 4–11)

## 2023-07-18 PROCEDURE — 76705 ECHO EXAM OF ABDOMEN: CPT

## 2023-07-18 PROCEDURE — 36415 COLL VENOUS BLD VENIPUNCTURE: CPT

## 2023-07-18 PROCEDURE — 85610 PROTHROMBIN TIME: CPT

## 2023-07-18 PROCEDURE — 80053 COMPREHEN METABOLIC PANEL: CPT

## 2023-07-18 PROCEDURE — 85027 COMPLETE CBC AUTOMATED: CPT

## 2023-07-18 PROCEDURE — 82248 BILIRUBIN DIRECT: CPT

## 2023-07-19 NOTE — RESULT ENCOUNTER NOTE
Ms. Shepard:    This ultrasound showed evidence of cirrhosis, as expected, but no other concerning labs. Your recent liver enzymes are stable.    As we have discussed, I suggest that you have a liver ultrasound every 6 months.    If you have any questions about your liver disease care or tests, you can call the clinic at [phone].     - Dr. Carranza

## 2023-08-18 ENCOUNTER — TELEPHONE (OUTPATIENT)
Dept: GASTROENTEROLOGY | Facility: CLINIC | Age: 63
End: 2023-08-18
Payer: COMMERCIAL

## 2023-08-18 NOTE — TELEPHONE ENCOUNTER
"Per Dr. Carranza: taking this medication is OK -- it only rarely has liver side effects.     Patient's phone number \"international contracts\".    Fewziont message sent.    REBECCA Morris, RN, PHN  Hepatology Clinic  [facility]  [facility]    "

## 2023-08-18 NOTE — TELEPHONE ENCOUNTER
[facility] Call Center    Phone Message    May a detailed message be left on voicemail: yes     Reason for Call: Other: Patient just returned from [place].  She has a low grade fever, head feels like it is going to explode, and she has tested positive on 3 Covid tests.  She is wondering if the RX: remdesivir is okay to take with her liver.  And if so, will Dr. Carranza prescribe it.  Please follow up with patient.     Action Taken: Message routed to:  [facility] ([facility]): [facility] Hepatology Adult [facility]    Travel Screening: Not Applicable

## 2023-09-09 ENCOUNTER — HEALTH MAINTENANCE LETTER (OUTPATIENT)
Age: 63
End: 2023-09-09

## 2023-10-02 ENCOUNTER — VIRTUAL VISIT (OUTPATIENT)
Dept: ENDOCRINOLOGY | Facility: CLINIC | Age: 63
End: 2023-10-02
Payer: COMMERCIAL

## 2023-10-02 VITALS — HEIGHT: 66 IN | BODY MASS INDEX: 23.63 KG/M2 | WEIGHT: 147 LBS

## 2023-10-02 DIAGNOSIS — E11.65 TYPE 2 DIABETES MELLITUS WITH HYPERGLYCEMIA, WITH LONG-TERM CURRENT USE OF INSULIN (H): ICD-10-CM

## 2023-10-02 DIAGNOSIS — Z79.4 TYPE 2 DIABETES MELLITUS WITH HYPERGLYCEMIA, WITH LONG-TERM CURRENT USE OF INSULIN (H): ICD-10-CM

## 2023-10-02 PROCEDURE — 99442 PR PHYSICIAN TELEPHONE EVALUATION 11-20 MIN: CPT | Mod: 93 | Performed by: INTERNAL MEDICINE

## 2023-10-02 ASSESSMENT — PAIN SCALES - GENERAL: PAINLEVEL: NO PAIN (0)

## 2023-10-02 NOTE — LETTER
"10/2/2023       RE: Nina Shepard  [address] [place] [address]     Dear Colleague,    Thank you for referring your patient, Nina Shepard, to the [facility] WEIGHT MANAGEMENT CLINIC [place] at [facility]. Please see a copy of my visit note below.    Virtual Visit Details    Type of service:  Telephone Visit   Phone call duration: 15 minutes           Return Medical Weight Management Note     Nina Shepard  MRN:  [id number]  :  1960  TOMMY:  10/02/23    Dear [facility],    I had the pleasure of seeing your patient Nina Shepard.  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  Vitals:  B/P: 118/77, P: 74    Initial consult weight was 158 on 10/17/22.  Weight change since last seen on 3/20/2023 is down 7 pounds.   Total loss is11 pounds.    INTERVAL HISTORY:  Semaglutide now tolerating and helps make better choice and now not night eating. Liver size may be down a bit at last check. Found to have adequate c-peptide (7) and BERONICA ab negative.          No data to display                MEDICATIONS:   Current Outpatient Medications   Medication    ammonium lactate (LAC-HYDRIN) 12 % external lotion    Apremilast (OTEZLA) 10 & 20 & 30 MG TBPK    artificial tears (SOOTHE " NIGHT TIME) ophthalmic ointment    atorvastatin (LIPITOR) 20 MG tablet    bisacodyl (DULCOLAX) 5 MG EC tablet    calcium carbonate-vitamin D (OS-ABDON WITH D) 500-200 MG-UNIT tablet    carboxymethylcellulose (REFRESH PLUS) 0.5 % SOLN ophthalmic solution    conjugated estrogens (PREMARIN) 0.625 MG/GM vaginal cream    cyclobenzaprine (FLEXERIL) 10 MG tablet    cyclobenzaprine (FLEXERIL) 5 MG tablet    hypromellose (GENTEAL) 0.3 % SOLN ophthalmic solution    insulin aspart (NOVOLOG PEN) 100 UNIT/ML pen    insulin glargine (LANTUS VIAL) 100 UNIT/ML vial    INVOKANA 300 MG tablet    JARDIANCE 25 MG TABS tablet    levothyroxine (SYNTHROID/LEVOTHROID) 100 MCG tablet    LORazepam (ATIVAN) 1 MG tablet    meclizine (ANTIVERT) 25 MG tablet    metoprolol succinate ER (TOPROL XL) 25 MG 24 hr tablet    Multiple Vitamin (MULTIVITAMIN ADULT PO)    NONFORMULARY    NONFORMULARY    ondansetron (ZOFRAN) 4 MG tablet    polyethylene glycol (GOLYTELY) 236 g suspension    polyethylene glycol-propylene glycol (SYSTANE ULTRA) 0.4-0.3 % SOLN ophthalmic solution    Semaglutide, 1 MG/DOSE, (OZEMPIC) 4 MG/3ML pen    Semaglutide, 2 MG/DOSE, (OZEMPIC) 8 MG/3ML pen    valACYclovir (VALTREX) 1000 mg tablet    valsartan (DIOVAN) 40 MG tablet    zolpidem (AMBIEN) 5 MG tablet     No current facility-administered medications for this visit.         10/2/2023    10:27 AM   Weight Loss Medication History Reviewed With Patient   Which weight loss medications are you currently taking on a regular basis? Ozempic   Are you having any side effects from the weight loss medication that we have prescribed you? No      ASSESSMENT:   Maintain semaglutide 2 mg/w for hoped benefit on insulin dosing and liver fat.     FOLLOW-UP:    12 weeks.    Sincerely,  Willie Walden MD

## 2023-10-02 NOTE — NURSING NOTE
Is the patient currently in the state of [place]? YES    Visit mode:TELEPHONE    If the visit is dropped, the patient can be reconnected by: TELEPHONE VISIT: Phone number: [phone]    Will anyone else be joining the visit? NO  (If patient encounters technical issues they should call [phone]    How would you like to obtain your AVS? MyChart    Are changes needed to the allergy or medication list? Pt stated no changes to allergies and Pt stated no med changes    Reason for visit: Follow Up    Marah MEDRANO

## 2023-10-02 NOTE — PROGRESS NOTES
"    Return Medical Weight Management Note     Nina Shepard  MRN:  [id number]  :  1960  TOMMY:  10/02/23    Dear [facility],    I had the pleasure of seeing your patient Nina Shepard.  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  Vitals:  B/P: 118/77, P: 74    Initial consult weight was 158 on 10/17/22.  Weight change since last seen on 3/20/2023 is down 7 pounds.   Total loss is11 pounds.    INTERVAL HISTORY:  Semaglutide now tolerating and helps make better choice and now not night eating. Liver size may be down a bit at last check. Found to have adequate c-peptide (7) and BERONICA ab negative.          No data to display                MEDICATIONS:   Current Outpatient Medications   Medication    ammonium lactate (LAC-HYDRIN) 12 % external lotion    Apremilast (OTEZLA) 10 & 20 & 30 MG TBPK    artificial tears (SOOTHE NIGHT TIME) ophthalmic ointment    atorvastatin (LIPITOR) 20 MG tablet    bisacodyl (DULCOLAX) 5 MG EC tablet    calcium carbonate-vitamin D (OS-ABDON WITH D) 500-200 MG-UNIT tablet    carboxymethylcellulose (REFRESH PLUS) 0.5 % SOLN ophthalmic solution    conjugated estrogens (PREMARIN) 0.625 MG/GM vaginal cream    cyclobenzaprine (FLEXERIL) 10 MG tablet    cyclobenzaprine (FLEXERIL) 5 MG tablet    hypromellose (GENTEAL) " 0.3 % SOLN ophthalmic solution    insulin aspart (NOVOLOG PEN) 100 UNIT/ML pen    insulin glargine (LANTUS VIAL) 100 UNIT/ML vial    INVOKANA 300 MG tablet    JARDIANCE 25 MG TABS tablet    levothyroxine (SYNTHROID/LEVOTHROID) 100 MCG tablet    LORazepam (ATIVAN) 1 MG tablet    meclizine (ANTIVERT) 25 MG tablet    metoprolol succinate ER (TOPROL XL) 25 MG 24 hr tablet    Multiple Vitamin (MULTIVITAMIN ADULT PO)    NONFORMULARY    NONFORMULARY    ondansetron (ZOFRAN) 4 MG tablet    polyethylene glycol (GOLYTELY) 236 g suspension    polyethylene glycol-propylene glycol (SYSTANE ULTRA) 0.4-0.3 % SOLN ophthalmic solution    Semaglutide, 1 MG/DOSE, (OZEMPIC) 4 MG/3ML pen    Semaglutide, 2 MG/DOSE, (OZEMPIC) 8 MG/3ML pen    valACYclovir (VALTREX) 1000 mg tablet    valsartan (DIOVAN) 40 MG tablet    zolpidem (AMBIEN) 5 MG tablet     No current facility-administered medications for this visit.         10/2/2023    10:27 AM   Weight Loss Medication History Reviewed With Patient   Which weight loss medications are you currently taking on a regular basis? Ozempic   Are you having any side effects from the weight loss medication that we have prescribed you? No      ASSESSMENT:   Maintain semaglutide 2 mg/w for hoped benefit on insulin dosing and liver fat.     FOLLOW-UP:    12 weeks.    Sincerely,  Willie Walden MD

## 2023-10-04 ENCOUNTER — TELEPHONE (OUTPATIENT)
Dept: ENDOCRINOLOGY | Facility: CLINIC | Age: 63
End: 2023-10-04
Payer: COMMERCIAL

## 2023-10-04 NOTE — TELEPHONE ENCOUNTER
SHANI and sent MYC regarding the following:     Return in about 4 months (around 2/2/2024). With Dr. Walden

## 2024-01-19 DIAGNOSIS — K75.81 LIVER CIRRHOSIS SECONDARY TO NASH (H): Primary | ICD-10-CM

## 2024-01-19 DIAGNOSIS — K74.60 LIVER CIRRHOSIS SECONDARY TO NASH (H): Primary | ICD-10-CM

## 2024-01-27 ENCOUNTER — HEALTH MAINTENANCE LETTER (OUTPATIENT)
Age: 64
End: 2024-01-27

## 2024-01-30 ENCOUNTER — HOSPITAL ENCOUNTER (OUTPATIENT)
Dept: ULTRASOUND IMAGING | Facility: CLINIC | Age: 64
Discharge: HOME | End: 2024-01-30
Attending: INTERNAL MEDICINE
Payer: COMMERCIAL

## 2024-01-30 ENCOUNTER — LAB (OUTPATIENT)
Dept: LAB | Facility: CLINIC | Age: 64
End: 2024-01-30
Attending: INTERNAL MEDICINE
Payer: COMMERCIAL

## 2024-01-30 DIAGNOSIS — K75.81 LIVER CIRRHOSIS SECONDARY TO NASH (H): ICD-10-CM

## 2024-01-30 DIAGNOSIS — K74.60 LIVER CIRRHOSIS SECONDARY TO NASH (H): ICD-10-CM

## 2024-01-30 LAB
ALBUMIN SERPL BCG-MCNC: 4.7 G/DL (ref 3.5–5.2)
ALP SERPL-CCNC: 58 U/L (ref 40–150)
ALT SERPL W P-5'-P-CCNC: 52 U/L (ref 0–50)
ANION GAP SERPL CALCULATED.3IONS-SCNC: 9 MMOL/L (ref 7–15)
AST SERPL W P-5'-P-CCNC: 25 U/L (ref 0–45)
BILIRUB SERPL-MCNC: 1 MG/DL
BUN SERPL-MCNC: 18.4 MG/DL (ref 8–23)
CALCIUM SERPL-MCNC: 9.5 MG/DL (ref 8.8–10.2)
CHLORIDE SERPL-SCNC: 103 MMOL/L (ref 98–107)
CREAT SERPL-MCNC: 0.69 MG/DL (ref 0.51–0.95)
DEPRECATED HCO3 PLAS-SCNC: 23 MMOL/L (ref 22–29)
EGFRCR SERPLBLD CKD-EPI 2021: >90 ML/MIN/1.73M2
ERYTHROCYTE [DISTWIDTH] IN BLOOD BY AUTOMATED COUNT: 12.7 % (ref 10–15)
GLUCOSE SERPL-MCNC: 199 MG/DL (ref 70–99)
HCT VFR BLD AUTO: 42.4 % (ref 35–47)
HGB BLD-MCNC: 14.2 G/DL (ref 11.7–15.7)
INR PPP: 1.04 (ref 0.85–1.15)
MCH RBC QN AUTO: 31.7 PG (ref 26.5–33)
MCHC RBC AUTO-ENTMCNC: 33.5 G/DL (ref 31.5–36.5)
MCV RBC AUTO: 95 FL (ref 78–100)
PLATELET # BLD AUTO: 106 10E3/UL (ref 150–450)
POTASSIUM SERPL-SCNC: 4.3 MMOL/L (ref 3.4–5.3)
PROT SERPL-MCNC: 7.8 G/DL (ref 6.4–8.3)
RBC # BLD AUTO: 4.48 10E6/UL (ref 3.8–5.2)
SODIUM SERPL-SCNC: 135 MMOL/L (ref 135–145)
WBC # BLD AUTO: 4.9 10E3/UL (ref 4–11)

## 2024-01-30 PROCEDURE — 36415 COLL VENOUS BLD VENIPUNCTURE: CPT

## 2024-01-30 PROCEDURE — 76705 ECHO EXAM OF ABDOMEN: CPT

## 2024-01-30 PROCEDURE — 85610 PROTHROMBIN TIME: CPT

## 2024-01-30 PROCEDURE — 82040 ASSAY OF SERUM ALBUMIN: CPT

## 2024-01-30 PROCEDURE — 85027 COMPLETE CBC AUTOMATED: CPT

## 2024-01-30 NOTE — RESULT ENCOUNTER NOTE
Ms. Shepard:    This ultrasound does not show any concerning liver lesions. I suggest that you have a liver ultrasound about every 6 months.    If you have any questions about your liver disease care or tests, you can call the clinic at [phone].     - Dr. Carranza

## 2024-01-30 NOTE — RESULT ENCOUNTER NOTE
Ms. Shepard:    These liver tests and CBC are stable.    If you have any questions about your liver disease care or tests, you can call the clinic at [phone].     - Dr. Carranza

## 2024-02-12 ENCOUNTER — VIRTUAL VISIT (OUTPATIENT)
Dept: ENDOCRINOLOGY | Facility: CLINIC | Age: 64
End: 2024-02-12
Payer: COMMERCIAL

## 2024-02-12 ENCOUNTER — TELEPHONE (OUTPATIENT)
Dept: ENDOCRINOLOGY | Facility: CLINIC | Age: 64
End: 2024-02-12

## 2024-02-12 VITALS — HEIGHT: 66 IN | BODY MASS INDEX: 23.3 KG/M2 | WEIGHT: 145 LBS

## 2024-02-12 DIAGNOSIS — Z79.4 TYPE 2 DIABETES MELLITUS WITH HYPERGLYCEMIA, WITH LONG-TERM CURRENT USE OF INSULIN (H): Primary | ICD-10-CM

## 2024-02-12 DIAGNOSIS — E11.65 TYPE 2 DIABETES MELLITUS WITH HYPERGLYCEMIA, WITH LONG-TERM CURRENT USE OF INSULIN (H): Primary | ICD-10-CM

## 2024-02-12 PROCEDURE — 99442 PR PHYSICIAN TELEPHONE EVALUATION 11-20 MIN: CPT | Mod: 93 | Performed by: INTERNAL MEDICINE

## 2024-02-12 ASSESSMENT — PAIN SCALES - GENERAL: PAINLEVEL: NO PAIN (0)

## 2024-02-12 NOTE — TELEPHONE ENCOUNTER
Pt. called scheduling and stated she did not have video visit capabilities and changed the 11:30 am appointment today 2/12/2024 with Dr. Walden to a telephone visit.

## 2024-02-12 NOTE — NURSING NOTE
Is the patient currently in the state of [place]? YES    Visit mode:TELEPHONE    If the visit is dropped, the patient can be reconnected by: TELEPHONE VISIT: Phone number: [phone]    Will anyone else be joining the visit? NO  (If patient encounters technical issues they should call [phone])    How would you like to obtain your AVS? MyChart    Are changes needed to the allergy or medication list? Pt stated no changes to allergies and Pt stated no med changes  Please remove any meds marked not taking and any flagged for removal.    Reason for visit: RECHECK    Wt/ht other than 24 hrs:  taken within the week  Pain more than one location:  no  Suzette MEDRANO

## 2024-02-12 NOTE — LETTER
"2024       RE: Nina Shepard  [address]  [place] [address]     Dear Colleague,    Thank you for referring your patient, Nina Shepard, to the [facility] WEIGHT MANAGEMENT CLINIC [place] at [facility]. Please see a copy of my visit note below.    Virtual Visit Details    Type of service:  Telephone Visit   Phone call duration: 15 minutes         Return Medical Weight Management Note     Nina Shepard  MRN:  [id number]  :  1960  TOMMY:  24    Dear [facility],    I had the pleasure of seeing your patient Nina Shepard.  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  Vitals:  B/P: 118/77, P: 74    Initial consult weight was 158 on 10/17/22.  Weight change since last seen on 10/02/23 is down 2 pounds.   Total loss is 13 pounds.    INTERVAL HISTORY:  Semaglutide now tolerating and helps make better choice and now not night eating. A1c very good and liver apparently stable. Found to have adequate c-peptide (7) and BERONICA ab negative.          No data to display                MEDICATIONS:   Current Outpatient Medications   Medication    ammonium lactate (LAC-HYDRIN) 12 % external lotion    Apremilast (OTEZLA) 10 & 20 & 30 MG TBPK    artificial tears (SOOTHE NIGHT " TIME) ophthalmic ointment    atorvastatin (LIPITOR) 20 MG tablet    bisacodyl (DULCOLAX) 5 MG EC tablet    calcium carbonate-vitamin D (OS-ABDON WITH D) 500-200 MG-UNIT tablet    carboxymethylcellulose (REFRESH PLUS) 0.5 % SOLN ophthalmic solution    conjugated estrogens (PREMARIN) 0.625 MG/GM vaginal cream    cyclobenzaprine (FLEXERIL) 10 MG tablet    cyclobenzaprine (FLEXERIL) 5 MG tablet    hypromellose (GENTEAL) 0.3 % SOLN ophthalmic solution    insulin aspart (NOVOLOG PEN) 100 UNIT/ML pen    insulin glargine (LANTUS VIAL) 100 UNIT/ML vial    INVOKANA 300 MG tablet    JARDIANCE 25 MG TABS tablet    levothyroxine (SYNTHROID/LEVOTHROID) 100 MCG tablet    LORazepam (ATIVAN) 1 MG tablet    meclizine (ANTIVERT) 25 MG tablet    metoprolol succinate ER (TOPROL XL) 25 MG 24 hr tablet    Multiple Vitamin (MULTIVITAMIN ADULT PO)    NONFORMULARY    NONFORMULARY    ondansetron (ZOFRAN) 4 MG tablet    polyethylene glycol (GOLYTELY) 236 g suspension    polyethylene glycol-propylene glycol (SYSTANE ULTRA) 0.4-0.3 % SOLN ophthalmic solution    Semaglutide, 2 MG/DOSE, (OZEMPIC) 8 MG/3ML pen    valsartan (DIOVAN) 40 MG tablet    zolpidem (AMBIEN) 5 MG tablet    valACYclovir (VALTREX) 1000 mg tablet     No current facility-administered medications for this visit.         2/12/2024    11:27 AM   Weight Loss Medication History Reviewed With Patient   Which weight loss medications are you currently taking on a regular basis? Ozempic   Are you having any side effects from the weight loss medication that we have prescribed you? No      ASSESSMENT:   Maintain semaglutide 2 mg/w for hoped benefit on insulin dosing and liver fat.     FOLLOW-UP:    12 weeks.    Sincerely,  Willie Walden MD

## 2024-02-12 NOTE — PROGRESS NOTES
"    Return Medical Weight Management Note     Nina Shepard  MRN:  [id number]  :  1960  TOMMY:  23    Dear [facility],    I had the pleasure of seeing your patient Nina Shepard.  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  Vitals:  B/P: 118/77, P: 74    Initial consult weight was 158 on 10/17/22.  Weight change since last seen on 10/02/23 is down 2 pounds.   Total loss is 13 pounds.    INTERVAL HISTORY:  Semaglutide now tolerating and helps make better choice and now not night eating. A1c very good and liver apparently stable. Found to have adequate c-peptide (7) and BERONICA ab negative.          No data to display                MEDICATIONS:   Current Outpatient Medications   Medication    ammonium lactate (LAC-HYDRIN) 12 % external lotion    Apremilast (OTEZLA) 10 & 20 & 30 MG TBPK    artificial tears (SOOTHE NIGHT TIME) ophthalmic ointment    atorvastatin (LIPITOR) 20 MG tablet    bisacodyl (DULCOLAX) 5 MG EC tablet    calcium carbonate-vitamin D (OS-ABDON WITH D) 500-200 MG-UNIT tablet    carboxymethylcellulose (REFRESH PLUS) 0.5 % SOLN ophthalmic solution    conjugated estrogens (PREMARIN) 0.625 MG/GM vaginal cream    cyclobenzaprine (FLEXERIL) 10 MG tablet    cyclobenzaprine (FLEXERIL) 5 MG tablet    hypromellose (GENTEAL) 0.3 % " SOLN ophthalmic solution    insulin aspart (NOVOLOG PEN) 100 UNIT/ML pen    insulin glargine (LANTUS VIAL) 100 UNIT/ML vial    INVOKANA 300 MG tablet    JARDIANCE 25 MG TABS tablet    levothyroxine (SYNTHROID/LEVOTHROID) 100 MCG tablet    LORazepam (ATIVAN) 1 MG tablet    meclizine (ANTIVERT) 25 MG tablet    metoprolol succinate ER (TOPROL XL) 25 MG 24 hr tablet    Multiple Vitamin (MULTIVITAMIN ADULT PO)    NONFORMULARY    NONFORMULARY    ondansetron (ZOFRAN) 4 MG tablet    polyethylene glycol (GOLYTELY) 236 g suspension    polyethylene glycol-propylene glycol (SYSTANE ULTRA) 0.4-0.3 % SOLN ophthalmic solution    Semaglutide, 2 MG/DOSE, (OZEMPIC) 8 MG/3ML pen    valsartan (DIOVAN) 40 MG tablet    zolpidem (AMBIEN) 5 MG tablet    valACYclovir (VALTREX) 1000 mg tablet     No current facility-administered medications for this visit.         2/12/2024    11:27 AM   Weight Loss Medication History Reviewed With Patient   Which weight loss medications are you currently taking on a regular basis? Ozempic   Are you having any side effects from the weight loss medication that we have prescribed you? No      ASSESSMENT:   Maintain semaglutide 2 mg/w for hoped benefit on insulin dosing and liver fat.     FOLLOW-UP:    12 weeks.    Sincerely,  Willie Walden MD

## 2024-02-14 ENCOUNTER — TELEPHONE (OUTPATIENT)
Dept: ENDOCRINOLOGY | Facility: CLINIC | Age: 64
End: 2024-02-14
Payer: COMMERCIAL

## 2024-02-14 NOTE — TELEPHONE ENCOUNTER
Left Voicemail (1st Attempt) and Sent Mychart (1st Attempt) for the patient to call back and schedule the following:    Appointment type: [facility]   Provider:    Return date: 6 mos ( 8 /12/24 )  Specialty phone number: [phone]  Additional appointment(s) needed: no   Additonal Notes: no

## 2024-03-25 ENCOUNTER — VIRTUAL VISIT (OUTPATIENT)
Dept: ENDOCRINOLOGY | Facility: CLINIC | Age: 64
End: 2024-03-25
Payer: COMMERCIAL

## 2024-03-25 VITALS — WEIGHT: 152 LBS | HEIGHT: 66 IN | BODY MASS INDEX: 24.43 KG/M2

## 2024-03-25 DIAGNOSIS — Z79.4 TYPE 2 DIABETES MELLITUS WITH HYPERGLYCEMIA, WITH LONG-TERM CURRENT USE OF INSULIN (H): Primary | ICD-10-CM

## 2024-03-25 DIAGNOSIS — E11.65 TYPE 2 DIABETES MELLITUS WITH HYPERGLYCEMIA, WITH LONG-TERM CURRENT USE OF INSULIN (H): Primary | ICD-10-CM

## 2024-03-25 PROCEDURE — 99442 PR PHYSICIAN TELEPHONE EVALUATION 11-20 MIN: CPT | Mod: 93 | Performed by: INTERNAL MEDICINE

## 2024-03-25 ASSESSMENT — PAIN SCALES - GENERAL: PAINLEVEL: EXTREME PAIN (8)

## 2024-03-25 NOTE — LETTER
"3/25/2024       RE: Nina Shepard  [address]  [place] [address]     Dear Colleague,    Thank you for referring your patient, Nina Shepard, to the [facility] WEIGHT MANAGEMENT CLINIC [place] at [facility]. Please see a copy of my visit note below.    Virtual Visit Details    Type of service:  Telephone Visit   Phone call duration: 15 minutes   Originating Location (pt. Location): Home    Distant Location (provider location):  Off-site        Return Medical Weight Management Note     Nina Shepard  MRN:  [id number]  :  1960  TOMMY:  24    Dear [facility],    I had the pleasure of seeing your patient Nina Shepard.  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  Vitals:  B/P: 118/77, P: 74    Initial consult weight was 158 on 10/17/22.  Weight change since last seen on 24 is up 7 pounds.   Total loss is 6 pounds.    INTERVAL HISTORY:  Semaglutide now tolerating and helps make better choice and now not night eating. A1c very good and liver apparently stable. Found to have adequate c-peptide (7) and BERONICA ab negative. Taking lantus 70, lispro 50 BID and jardiance 25.         No data to display                MEDICATIONS:   Current Outpatient Medications "   Medication    ammonium lactate (LAC-HYDRIN) 12 % external lotion    Apremilast (OTEZLA) 10 & 20 & 30 MG TBPK    artificial tears (SOOTHE NIGHT TIME) ophthalmic ointment    atorvastatin (LIPITOR) 20 MG tablet    bisacodyl (DULCOLAX) 5 MG EC tablet    calcium carbonate-vitamin D (OS-ABDON WITH D) 500-200 MG-UNIT tablet    carboxymethylcellulose (REFRESH PLUS) 0.5 % SOLN ophthalmic solution    conjugated estrogens (PREMARIN) 0.625 MG/GM vaginal cream    cyclobenzaprine (FLEXERIL) 10 MG tablet    cyclobenzaprine (FLEXERIL) 5 MG tablet    hypromellose (GENTEAL) 0.3 % SOLN ophthalmic solution    insulin aspart (NOVOLOG PEN) 100 UNIT/ML pen    insulin glargine (LANTUS VIAL) 100 UNIT/ML vial    INVOKANA 300 MG tablet    JARDIANCE 25 MG TABS tablet    levothyroxine (SYNTHROID/LEVOTHROID) 100 MCG tablet    LORazepam (ATIVAN) 1 MG tablet    meclizine (ANTIVERT) 25 MG tablet    metoprolol succinate ER (TOPROL XL) 25 MG 24 hr tablet    Multiple Vitamin (MULTIVITAMIN ADULT PO)    NONFORMULARY    NONFORMULARY    ondansetron (ZOFRAN) 4 MG tablet    polyethylene glycol (GOLYTELY) 236 g suspension    polyethylene glycol-propylene glycol (SYSTANE ULTRA) 0.4-0.3 % SOLN ophthalmic solution    Semaglutide, 2 MG/DOSE, (OZEMPIC) 8 MG/3ML pen    valsartan (DIOVAN) 40 MG tablet    zolpidem (AMBIEN) 5 MG tablet    valACYclovir (VALTREX) 1000 mg tablet     No current facility-administered medications for this visit.         3/25/2024     7:42 AM   Weight Loss Medication History Reviewed With Patient   Which weight loss medications are you currently taking on a regular basis? Ozempic   Are you having any side effects from the weight loss medication that we have prescribed you? No      ASSESSMENT:   Maintain semaglutide 2 mg/w for hoped benefit on insulin dosing and liver fat.     FOLLOW-UP:    12 weeks.    Sincerely,  Willie Walden MD

## 2024-03-25 NOTE — PROGRESS NOTES
Virtual Visit Details    Type of service:  Telephone Visit   Phone call duration: 15 minutes   Originating Location (pt. Location): Home    Distant Location (provider location):  Off-site

## 2024-03-25 NOTE — PROGRESS NOTES
"    Return Medical Weight Management Note     Nina Shepard  MRN:  [id number]  :  1960  TOMMY:  24    Dear [facility],    I had the pleasure of seeing your patient Nina Shepard.  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  Vitals:  B/P: 118/77, P: 74    Initial consult weight was 158 on 10/17/22.  Weight change since last seen on 24 is up 7 pounds.   Total loss is 6 pounds.    INTERVAL HISTORY:  Semaglutide now tolerating and helps make better choice and now not night eating. A1c very good and liver apparently stable. Found to have adequate c-peptide (7) and BERONICA ab negative. Taking lantus 70, lispro 50 BID and jardiance 25. Wants us to manage DM2 due to primary penitent[facility].         No data to display                MEDICATIONS:   Current Outpatient Medications   Medication    ammonium lactate (LAC-HYDRIN) 12 % external lotion    Apremilast (OTEZLA) 10 & 20 & 30 MG TBPK    artificial tears (SOOTHE NIGHT TIME) ophthalmic ointment    atorvastatin (LIPITOR) 20 MG tablet    bisacodyl (DULCOLAX) 5 MG EC tablet    calcium carbonate-vitamin D (OS-ABDON WITH D) 500-200 MG-UNIT tablet    carboxymethylcellulose (REFRESH PLUS) 0.5 % SOLN ophthalmic solution    conjugated estrogens (PREMARIN) 0.625 MG/GM vaginal cream    " cyclobenzaprine (FLEXERIL) 10 MG tablet    cyclobenzaprine (FLEXERIL) 5 MG tablet    hypromellose (GENTEAL) 0.3 % SOLN ophthalmic solution    insulin aspart (NOVOLOG PEN) 100 UNIT/ML pen    insulin glargine (LANTUS VIAL) 100 UNIT/ML vial    INVOKANA 300 MG tablet    JARDIANCE 25 MG TABS tablet    levothyroxine (SYNTHROID/LEVOTHROID) 100 MCG tablet    LORazepam (ATIVAN) 1 MG tablet    meclizine (ANTIVERT) 25 MG tablet    metoprolol succinate ER (TOPROL XL) 25 MG 24 hr tablet    Multiple Vitamin (MULTIVITAMIN ADULT PO)    NONFORMULARY    NONFORMULARY    ondansetron (ZOFRAN) 4 MG tablet    polyethylene glycol (GOLYTELY) 236 g suspension    polyethylene glycol-propylene glycol (SYSTANE ULTRA) 0.4-0.3 % SOLN ophthalmic solution    Semaglutide, 2 MG/DOSE, (OZEMPIC) 8 MG/3ML pen    valsartan (DIOVAN) 40 MG tablet    zolpidem (AMBIEN) 5 MG tablet    valACYclovir (VALTREX) 1000 mg tablet     No current facility-administered medications for this visit.         3/25/2024     7:42 AM   Weight Loss Medication History Reviewed With Patient   Which weight loss medications are you currently taking on a regular basis? Ozempic   Are you having any side effects from the weight loss medication that we have prescribed you? No      ASSESSMENT:   Maintain semaglutide 2 mg/w for hoped benefit on insulin dosing and liver fat.     FOLLOW-UP:    12 weeks.    Sincerely,  Willie Walden MD

## 2024-03-25 NOTE — NURSING NOTE
"Is the patient currently in the state of [place]? YES    Visit mode:TELEPHONE    If the visit is dropped, the patient can be reconnected by: TELEPHONE VISIT: Phone number:   Telephone Information:   Mobile [phone]       Will anyone else be joining the visit? NO  (If patient encounters technical issues they should call [phone])    How would you like to obtain your AVS? MyChart    Are changes needed to the allergy or medication list? No, Pt stated no changes to allergies, and Pt stated no med changes    Reason for visit: RECHECK ([facility])    Lacy Sarabia VVF    Pt stated she is looking for test strips (Accucheck Glide), or would like to switch to Bev.  Pt stated tolerating Ozempic well, stated watching out for \"Ozempic face\" stated no signs of it yet.   "

## 2024-06-04 ENCOUNTER — TELEPHONE (OUTPATIENT)
Dept: GASTROENTEROLOGY | Facility: CLINIC | Age: 64
End: 2024-06-04

## 2024-06-04 ENCOUNTER — LAB (OUTPATIENT)
Dept: LAB | Facility: CLINIC | Age: 64
End: 2024-06-04
Payer: COMMERCIAL

## 2024-06-04 DIAGNOSIS — K75.81 LIVER CIRRHOSIS SECONDARY TO NASH (H): ICD-10-CM

## 2024-06-04 DIAGNOSIS — K74.60 LIVER CIRRHOSIS SECONDARY TO NASH (H): Primary | ICD-10-CM

## 2024-06-04 DIAGNOSIS — K75.81 LIVER CIRRHOSIS SECONDARY TO NASH (H): Primary | ICD-10-CM

## 2024-06-04 DIAGNOSIS — K74.60 LIVER CIRRHOSIS SECONDARY TO NASH (H): ICD-10-CM

## 2024-06-04 PROCEDURE — 36415 COLL VENOUS BLD VENIPUNCTURE: CPT

## 2024-06-04 PROCEDURE — 85027 COMPLETE CBC AUTOMATED: CPT

## 2024-06-04 PROCEDURE — 82105 ALPHA-FETOPROTEIN SERUM: CPT

## 2024-06-04 PROCEDURE — 80053 COMPREHEN METABOLIC PANEL: CPT

## 2024-06-04 PROCEDURE — 85610 PROTHROMBIN TIME: CPT

## 2024-06-04 NOTE — TELEPHONE ENCOUNTER
[facility] Call Center    Phone Message    May a detailed message be left on voicemail: yes     Reason for Call: Order(s): Other:   Reason for requested: Pt requesting orders for full abdomin ultrasound before appt.   Date needed: 6/11/24  Provider name: Rogerio Carranza MD    Action Taken: Message routed to:  Other: [facility].    Travel Screening: Not Applicable

## 2024-06-05 ENCOUNTER — HOSPITAL ENCOUNTER (OUTPATIENT)
Dept: ULTRASOUND IMAGING | Facility: CLINIC | Age: 64
Discharge: HOME | End: 2024-06-05
Attending: INTERNAL MEDICINE | Admitting: INTERNAL MEDICINE
Payer: COMMERCIAL

## 2024-06-05 DIAGNOSIS — K75.81 LIVER CIRRHOSIS SECONDARY TO NASH (H): ICD-10-CM

## 2024-06-05 DIAGNOSIS — K74.60 LIVER CIRRHOSIS SECONDARY TO NASH (H): ICD-10-CM

## 2024-06-05 LAB
ERYTHROCYTE [DISTWIDTH] IN BLOOD BY AUTOMATED COUNT: 12.7 % (ref 10–15)
HCT VFR BLD AUTO: 42.1 % (ref 35–47)
HGB BLD-MCNC: 14.5 G/DL (ref 11.7–15.7)
INR PPP: 1.07 (ref 0.85–1.15)
MCH RBC QN AUTO: 31.9 PG (ref 26.5–33)
MCHC RBC AUTO-ENTMCNC: 34.4 G/DL (ref 31.5–36.5)
MCV RBC AUTO: 93 FL (ref 78–100)
PLATELET # BLD AUTO: 89 10E3/UL (ref 150–450)
RBC # BLD AUTO: 4.55 10E6/UL (ref 3.8–5.2)
WBC # BLD AUTO: 4.5 10E3/UL (ref 4–11)

## 2024-06-05 PROCEDURE — 76705 ECHO EXAM OF ABDOMEN: CPT

## 2024-06-05 NOTE — RESULT ENCOUNTER NOTE
Mr. Shepard:    This ultrasound (US) did not show any focal liver lesion. I suggest that you continue to get a liver US about every 6 months.    If you have any questions about your liver disease care or tests, you can call the clinic at [phone].     - Dr. Carranza

## 2024-06-06 ENCOUNTER — TELEPHONE (OUTPATIENT)
Dept: GASTROENTEROLOGY | Facility: CLINIC | Age: 64
End: 2024-06-06
Payer: COMMERCIAL

## 2024-06-06 DIAGNOSIS — K74.60 LIVER CIRRHOSIS SECONDARY TO NASH (H): Primary | ICD-10-CM

## 2024-06-06 DIAGNOSIS — K75.81 LIVER CIRRHOSIS SECONDARY TO NASH (H): Primary | ICD-10-CM

## 2024-06-06 LAB
AFP SERPL-MCNC: 5.5 NG/ML
ALBUMIN SERPL BCG-MCNC: 4.4 G/DL (ref 3.5–5.2)
ALP SERPL-CCNC: 58 U/L (ref 40–150)
ALT SERPL W P-5'-P-CCNC: 70 U/L (ref 0–50)
ANION GAP SERPL CALCULATED.3IONS-SCNC: 13 MMOL/L (ref 7–15)
AST SERPL W P-5'-P-CCNC: 29 U/L (ref 0–45)
BILIRUB DIRECT SERPL-MCNC: ABNORMAL MG/DL
BILIRUB SERPL-MCNC: 0.7 MG/DL
BUN SERPL-MCNC: 13.9 MG/DL (ref 8–23)
CALCIUM SERPL-MCNC: 9.5 MG/DL (ref 8.8–10.2)
CHLORIDE SERPL-SCNC: 104 MMOL/L (ref 98–107)
CREAT SERPL-MCNC: 0.73 MG/DL (ref 0.51–0.95)
DEPRECATED HCO3 PLAS-SCNC: 23 MMOL/L (ref 22–29)
EGFRCR SERPLBLD CKD-EPI 2021: >90 ML/MIN/1.73M2
GLUCOSE SERPL-MCNC: 294 MG/DL (ref 70–99)
POTASSIUM SERPL-SCNC: 4.7 MMOL/L (ref 3.4–5.3)
PROT SERPL-MCNC: 6.9 G/DL (ref 6.4–8.3)
SODIUM SERPL-SCNC: 140 MMOL/L (ref 135–145)

## 2024-06-06 NOTE — TELEPHONE ENCOUNTER
[facility] Call Center    Phone Message    May a detailed message be left on voicemail: yes     Reason for Call: Other: Pt states that labs came back good. She's needing advice as to keeping her upcoming appt or if she can cancel it. Please call pt. With advice.     Action Taken: Message routed to:  Other: [facility]Liver GI    Travel Screening: Not Applicable

## 2024-06-06 NOTE — TELEPHONE ENCOUNTER
Communicated to patient via Hua Kang.    ANA MorrisN, RN, PHN  Hepatology Clinic  [facility]  [facility]

## 2024-06-11 ENCOUNTER — OFFICE VISIT (OUTPATIENT)
Dept: GASTROENTEROLOGY | Facility: CLINIC | Age: 64
End: 2024-06-11
Attending: INTERNAL MEDICINE
Payer: COMMERCIAL

## 2024-06-11 VITALS
BODY MASS INDEX: 23.78 KG/M2 | DIASTOLIC BLOOD PRESSURE: 73 MMHG | SYSTOLIC BLOOD PRESSURE: 150 MMHG | HEART RATE: 70 BPM | OXYGEN SATURATION: 97 % | HEIGHT: 66 IN | WEIGHT: 148 LBS

## 2024-06-11 DIAGNOSIS — K74.60 CIRRHOSIS OF LIVER WITHOUT ASCITES, UNSPECIFIED HEPATIC CIRRHOSIS TYPE (H): ICD-10-CM

## 2024-06-11 PROCEDURE — 99213 OFFICE O/P EST LOW 20 MIN: CPT | Performed by: INTERNAL MEDICINE

## 2024-06-11 ASSESSMENT — PAIN SCALES - GENERAL: PAINLEVEL: MODERATE PAIN (5)

## 2024-06-11 NOTE — PATIENT INSTRUCTIONS
Summary:    1.  Your recent laboratory tests and ultrasound appear stable.  I suggest that we continue to get labs and ultrasound every 6 months, and that you return to this clinic in 1 year.  However, you can certainly return sooner if new liver problems arise.  As we discussed, continued attention to healthy diet and regular exercise is beneficial.  Please continue to avoid alcohol.    2.  Please continue to follow with Dr. Walden regarding your GLP-1 agonist treatment and type 2 diabetes.    If you have any questions about your liver disease care or tests, you can call the clinic at [phone].

## 2024-06-11 NOTE — NURSING NOTE
"Chief Complaint   Patient presents with    RECHECK     Cirrhosis of liver without ascites, unspecified hepatic cirrhosis type (H) +1 more       Vitals:    06/11/24 1601 06/11/24 1603   BP: (!) 160/73 (!) 150/73   BP Location: Left arm    Patient Position: Sitting    Cuff Size: Adult Regular    Pulse: 70    SpO2: 97%    Weight: 67.1 kg (148 lb)    Height: 1.676 m (5' 6\")        Body mass index is 23.89 kg/m .      Alexys Gale, SARIKAF    "

## 2024-06-11 NOTE — LETTER
2024      Nina Shepard  [address]  [place] [address]      Dear Colleague,    Thank you for referring your patient, Nina Shepard, to the [facility] SPECIALTY [facility]. Please see a copy of my visit note below.    [facility]       Hepatology Clinic    Date of Service: 2024       Primary Care Provider: Clinic, [facility]    History of Present Illness    Ms. Shepard presents for follow-up of [facility] with advanced fibrosis in the setting of type 2 diabetes. She is accompanied by her .     She reports no GI or hepatic symptoms. She pays attention to diet and exercise, and has a normal BMI. She follows with Dr. Walden in Endocrinology. She reports no alcohol use.      Past Medical History:  Past Medical History:   Diagnosis Date     Bell's palsy      Cancer (H)      Diabetes (H)      Heart disease      Hypertension      WARREN (nonalcoholic steatohepatitis)      PONV (postoperative nausea and vomiting)      Thyroid disease        Patient Active Problem List   Diagnosis     Bell's paralysis     Cirrhosis of liver without ascites (H)     Coronary atherosclerosis     Essential hypertension     Generalized anxiety disorder     Hypothyroidism     Major depressive disorder, recurrent episode, in partial remission (H24)     Mixed hyperlipidemia     Psoriatic arthropathy (H)     Type II diabetes mellitus (H)       Surgical History:  Past Surgical History:   Procedure Laterality Date     ABDOMINAL ADHESION SURGERY       APPENDECTOMY       CARDIAC SURGERY        SECTION       COLONOSCOPY N/A 2022    Procedure: COLONOSCOPY;  Surgeon: Rogerio Carranza MD;  Location:  GI     ENT SURGERY       ESOPHAGOSCOPY, GASTROSCOPY, DUODENOSCOPY (EGD), COMBINED N/A 2022    Procedure: ESOPHAGOGASTRODUODENOSCOPY (EGD);  Surgeon: Rogerio Carranza MD;  Location:  GI     HYSTERECTOMY       MAMMOPLASTY AUGMENTATION Bilateral  12/30/2016    Procedure: BILATERAL BREAST IMPLANT EXCHANGE;  Surgeon: Ermias Barakat MD;  Location: [facility] Main OR;  Service:      MASTECTOMY Bilateral      TONSILLECTOMY         Social History:  Social History     Tobacco Use     Smoking status: Never     Smokeless tobacco: Never   Vaping Use     Vaping status: Never Used   Substance Use Topics     Alcohol use: No     Drug use: No       Family History:  Family History   Problem Relation Age of Onset     Breast Cancer Mother      Coronary Artery Disease Father        Medications:  Current Outpatient Medications   Medication Sig Dispense Refill     ammonium lactate (LAC-HYDRIN) 12 % external lotion        Apremilast (OTEZLA) 10 & 20 & 30 MG TBPK        artificial tears (SOOTHE NIGHT TIME) ophthalmic ointment Apply to left eye nightly for 7 days.  May substitute generic. 3.5 g 0     atorvastatin (LIPITOR) 20 MG tablet Take 20 mg by mouth daily       bisacodyl (DULCOLAX) 5 MG EC tablet Take as directed. One day before exam take 2 tablets at 3 PM. Take 2 tablets at 11 PM. 4 tablet 0     blood glucose (NO BRAND SPECIFIED) test strip Use to test blood sugar 3 times daily or as directed. 100 strip 11     calcium carbonate-vitamin D (OS-ABDON WITH D) 500-200 MG-UNIT tablet Take 2 tablets by mouth daily       carboxymethylcellulose (REFRESH PLUS) 0.5 % SOLN ophthalmic solution 1 drop every 3 hours       conjugated estrogens (PREMARIN) 0.625 MG/GM vaginal cream Place 0.5 g vaginally       cyclobenzaprine (FLEXERIL) 10 MG tablet Take 10 mg by mouth       cyclobenzaprine (FLEXERIL) 5 MG tablet Take 5 mg by mouth       hypromellose (GENTEAL) 0.3 % SOLN ophthalmic solution 1 drop       insulin aspart (NOVOLOG PEN) 100 UNIT/ML pen Inject 30 Units Subcutaneous       insulin glargine (LANTUS VIAL) 100 UNIT/ML vial Inject 70 Units Subcutaneous       INVOKANA 300 MG tablet Take 300 mg by mouth daily       JARDIANCE 25 MG TABS tablet Take 25 mg by mouth daily       levothyroxine  "(SYNTHROID/LEVOTHROID) 100 MCG tablet Take 100 mcg by mouth       LORazepam (ATIVAN) 1 MG tablet Take 1 mg by mouth every 6 hours as needed for anxiety       meclizine (ANTIVERT) 25 MG tablet TAKE 1 TABLET BY MOUTH TWICE DAILY AS NEEDED FOR DIZZINESS       metoprolol succinate ER (TOPROL XL) 25 MG 24 hr tablet Take 1 tablet by mouth daily       Multiple Vitamin (MULTIVITAMIN ADULT PO)        NONFORMULARY TUDCA supplement       NONFORMULARY Liver support 1 tab daily       ondansetron (ZOFRAN) 4 MG tablet Take 4 mg by mouth       polyethylene glycol (GOLYTELY) 236 g suspension Take as directed. One day before exam fill the jug with water. Cover and shake until well mixed. At 6 PM start drinking an 8oz glass of mixture every 15 minutes until jug is 1/2 empty. Store remainder in the refrigerator.  At 11 PM Start drinking the other half of the Golytely jug. Drink one 8-ounce glass every 15 minutes until the jug is empty. 4000 mL 0     polyethylene glycol-propylene glycol (SYSTANE ULTRA) 0.4-0.3 % SOLN ophthalmic solution 1-2 drops       Semaglutide, 2 MG/DOSE, (OZEMPIC) 8 MG/3ML pen Inject 2 mg Subcutaneous every 7 days 9 mL 11     valsartan (DIOVAN) 40 MG tablet Take 1 tablet by mouth daily       zolpidem (AMBIEN) 5 MG tablet Take 5 mg by mouth       valACYclovir (VALTREX) 1000 mg tablet Take 1 tablet (1,000 mg) by mouth 3 times daily for 7 days 21 tablet 0     No current facility-administered medications for this visit.           Objective:         Vitals:    06/11/24 1601 06/11/24 1603   BP: (!) 160/73 (!) 150/73   BP Location: Left arm    Patient Position: Sitting    Cuff Size: Adult Regular    Pulse: 70    SpO2: 97%    Weight: 67.1 kg (148 lb)    Height: 1.676 m (5' 6\")      Body mass index is 23.89 kg/m .     Physical Exam  Constitutional: Well-developed, well-nourished, in no apparent distress.    HEENT: Normocephalic. No scleral icterus.   GI:  Abdomen soft, non-distended, non-tender. No overt hepatosplenomegaly. " "    Skin:  No rash noted.  A few spider nevi noted.    Peripheral Vascular: No lower extremity edema.   Musculoskeletal:  ROM intact, good muscle bulk    Psychiatric: Normal mood and affect. Behavior is normal.  Neuro:  No asterixis    Labs and Diagnostic tests:  Lab Results   Component Value Date     06/04/2024     09/15/2020    POTASSIUM 4.7 06/04/2024    POTASSIUM 4.3 08/09/2022    POTASSIUM 3.9 09/15/2020    CHLORIDE 104 06/04/2024    CHLORIDE 103 04/25/2023    CHLORIDE 108 09/15/2020    CO2 23 06/04/2024    CO2 22 08/09/2022    CO2 20 09/15/2020    BUN 13.9 06/04/2024    BUN 15 08/09/2022    BUN 18 09/15/2020    CR 0.73 06/04/2024    CR 0.70 09/15/2020     Lab Results   Component Value Date    BILITOTAL 0.7 06/04/2024    ALT 70 06/04/2024    AST 29 06/04/2024    ALKPHOS 58 06/04/2024     Lab Results   Component Value Date    ALBUMIN 4.4 06/04/2024    ALBUMIN 4.1 08/09/2022    PROTTOTAL 6.9 06/04/2024      Lab Results   Component Value Date    WBC 4.5 06/04/2024    WBC 6.2 09/15/2020    HGB 14.5 06/04/2024    HGB 14.6 09/15/2020    MCV 93 06/04/2024    MCV 93 09/15/2020    PLT 89 06/04/2024     09/15/2020     Lab Results   Component Value Date    INR 1.07 06/04/2024    INR 1.1 04/25/2023    INR 1.05 11/25/2008     Liver biopsy 2022:  LIVER, NEEDLE BIOPSY:  Steatohepatitis, mildly active, with variable but overall approx. 40% steatosis:   -Advanced fibrosis concerning for established cirrhosis   -No evidence of overlapping autoimmune hepatitis or biliary cholangitis     (See Comment)    US 6-5-24  IMPRESSION:  1.  Sequelae of chronic hepatocellular disease. No suspicious liver  lesion.  2.  LI-RADS US Category US-1 Negative: No US evidence of HCC.          Assessment and Plan:    [facility] with advanced fibrosis.  This patient is not obese but does have type 2 diabetes.  She has been on semaglutide, and tries to adhere to a healthy diet and regular exercise.    The optimal treatment of \"lean\" " patients MASH and type 2 diabetes has not been established. At this point, I suggest that she continue to work with Dr. Walden in Endocrinology regarding her diabetes and GLP-1 agonist therapy. We will arrange for a liver US every 6 months.         About 23 minutes spent with patient on the day of the visit, reviewing results, and coordinating care.    This note was created with voice recognition software, and while reviewed for accuracy, typos may remain.        Rogerio Carranza MD  Professor of Medicine  [facility]  Division of Gastroenterology, Hepatology, and Nutrition       Again, thank you for allowing me to participate in the care of your patient.        Sincerely,        Rogerio Carranza MD

## 2024-06-11 NOTE — PROGRESS NOTES
[facility]       Hepatology Clinic    Date of Service: 2024       Primary Care Provider: Jose, Cristóbal Whittaker    History of Present Illness    Ms. Shepard presents for follow-up of [facility] with advanced fibrosis in the setting of type 2 diabetes. She is accompanied by her .     She reports no GI or hepatic symptoms. She pays attention to diet and exercise, and has a normal BMI. She follows with Dr. Walden in Endocrinology. She reports no alcohol use.      Past Medical History:  Past Medical History:   Diagnosis Date    Bell's palsy     Cancer (H)     Diabetes (H)     Heart disease     Hypertension     WARREN (nonalcoholic steatohepatitis)     PONV (postoperative nausea and vomiting)     Thyroid disease        Patient Active Problem List   Diagnosis    Bell's paralysis    Cirrhosis of liver without ascites (H)    Coronary atherosclerosis    Essential hypertension    Generalized anxiety disorder    Hypothyroidism    Major depressive disorder, recurrent episode, in partial remission (H24)    Mixed hyperlipidemia    Psoriatic arthropathy (H)    Type II diabetes mellitus (H)       Surgical History:  Past Surgical History:   Procedure Laterality Date    ABDOMINAL ADHESION SURGERY      APPENDECTOMY      CARDIAC SURGERY       SECTION      COLONOSCOPY N/A 2022    Procedure: COLONOSCOPY;  Surgeon: Rogerio Carranza MD;  Location:  GI    ENT SURGERY      ESOPHAGOSCOPY, GASTROSCOPY, DUODENOSCOPY (EGD), COMBINED N/A 2022    Procedure: ESOPHAGOGASTRODUODENOSCOPY (EGD);  Surgeon: Rogerio Carranza MD;  Location:  GI    HYSTERECTOMY      MAMMOPLASTY AUGMENTATION Bilateral 2016    Procedure: BILATERAL BREAST IMPLANT EXCHANGE;  Surgeon: Ermias Barakat MD;  Location: [facility];  Service:     MASTECTOMY Bilateral     TONSILLECTOMY         Social History:  Social History     Tobacco Use    Smoking status: Never    Smokeless  tobacco: Never   Vaping Use    Vaping status: Never Used   Substance Use Topics    Alcohol use: No    Drug use: No       Family History:  Family History   Problem Relation Age of Onset    Breast Cancer Mother     Coronary Artery Disease Father        Medications:  Current Outpatient Medications   Medication Sig Dispense Refill    ammonium lactate (LAC-HYDRIN) 12 % external lotion       Apremilast (OTEZLA) 10 & 20 & 30 MG TBPK       artificial tears (SOOTHE NIGHT TIME) ophthalmic ointment Apply to left eye nightly for 7 days.  May substitute generic. 3.5 g 0    atorvastatin (LIPITOR) 20 MG tablet Take 20 mg by mouth daily      bisacodyl (DULCOLAX) 5 MG EC tablet Take as directed. One day before exam take 2 tablets at 3 PM. Take 2 tablets at 11 PM. 4 tablet 0    blood glucose (NO BRAND SPECIFIED) test strip Use to test blood sugar 3 times daily or as directed. 100 strip 11    calcium carbonate-vitamin D (OS-ABDON WITH D) 500-200 MG-UNIT tablet Take 2 tablets by mouth daily      carboxymethylcellulose (REFRESH PLUS) 0.5 % SOLN ophthalmic solution 1 drop every 3 hours      conjugated estrogens (PREMARIN) 0.625 MG/GM vaginal cream Place 0.5 g vaginally      cyclobenzaprine (FLEXERIL) 10 MG tablet Take 10 mg by mouth      cyclobenzaprine (FLEXERIL) 5 MG tablet Take 5 mg by mouth      hypromellose (GENTEAL) 0.3 % SOLN ophthalmic solution 1 drop      insulin aspart (NOVOLOG PEN) 100 UNIT/ML pen Inject 30 Units Subcutaneous      insulin glargine (LANTUS VIAL) 100 UNIT/ML vial Inject 70 Units Subcutaneous      INVOKANA 300 MG tablet Take 300 mg by mouth daily      JARDIANCE 25 MG TABS tablet Take 25 mg by mouth daily      levothyroxine (SYNTHROID/LEVOTHROID) 100 MCG tablet Take 100 mcg by mouth      LORazepam (ATIVAN) 1 MG tablet Take 1 mg by mouth every 6 hours as needed for anxiety      meclizine (ANTIVERT) 25 MG tablet TAKE 1 TABLET BY MOUTH TWICE DAILY AS NEEDED FOR DIZZINESS      metoprolol succinate ER (TOPROL XL) 25 MG  "24 hr tablet Take 1 tablet by mouth daily      Multiple Vitamin (MULTIVITAMIN ADULT PO)       NONFORMULARY TUDCA supplement      NONFORMULARY Liver support 1 tab daily      ondansetron (ZOFRAN) 4 MG tablet Take 4 mg by mouth      polyethylene glycol (GOLYTELY) 236 g suspension Take as directed. One day before exam fill the jug with water. Cover and shake until well mixed. At 6 PM start drinking an 8oz glass of mixture every 15 minutes until jug is 1/2 empty. Store remainder in the refrigerator.  At 11 PM Start drinking the other half of the Golytely jug. Drink one 8-ounce glass every 15 minutes until the jug is empty. 4000 mL 0    polyethylene glycol-propylene glycol (SYSTANE ULTRA) 0.4-0.3 % SOLN ophthalmic solution 1-2 drops      Semaglutide, 2 MG/DOSE, (OZEMPIC) 8 MG/3ML pen Inject 2 mg Subcutaneous every 7 days 9 mL 11    valsartan (DIOVAN) 40 MG tablet Take 1 tablet by mouth daily      zolpidem (AMBIEN) 5 MG tablet Take 5 mg by mouth      valACYclovir (VALTREX) 1000 mg tablet Take 1 tablet (1,000 mg) by mouth 3 times daily for 7 days 21 tablet 0     No current facility-administered medications for this visit.           Objective:         Vitals:    06/11/24 1601 06/11/24 1603   BP: (!) 160/73 (!) 150/73   BP Location: Left arm    Patient Position: Sitting    Cuff Size: Adult Regular    Pulse: 70    SpO2: 97%    Weight: 67.1 kg (148 lb)    Height: 1.676 m (5' 6\")      Body mass index is 23.89 kg/m .     Physical Exam  Constitutional: Well-developed, well-nourished, in no apparent distress.    HEENT: Normocephalic. No scleral icterus.   GI:  Abdomen soft, non-distended, non-tender. No overt hepatosplenomegaly.     Skin:  No rash noted.  A few spider nevi noted.    Peripheral Vascular: No lower extremity edema.   Musculoskeletal:  ROM intact, good muscle bulk    Psychiatric: Normal mood and affect. Behavior is normal.  Neuro:  No asterixis    Labs and Diagnostic tests:  Lab Results   Component Value Date     " "06/04/2024     09/15/2020    POTASSIUM 4.7 06/04/2024    POTASSIUM 4.3 08/09/2022    POTASSIUM 3.9 09/15/2020    CHLORIDE 104 06/04/2024    CHLORIDE 103 04/25/2023    CHLORIDE 108 09/15/2020    CO2 23 06/04/2024    CO2 22 08/09/2022    CO2 20 09/15/2020    BUN 13.9 06/04/2024    BUN 15 08/09/2022    BUN 18 09/15/2020    CR 0.73 06/04/2024    CR 0.70 09/15/2020     Lab Results   Component Value Date    BILITOTAL 0.7 06/04/2024    ALT 70 06/04/2024    AST 29 06/04/2024    ALKPHOS 58 06/04/2024     Lab Results   Component Value Date    ALBUMIN 4.4 06/04/2024    ALBUMIN 4.1 08/09/2022    PROTTOTAL 6.9 06/04/2024      Lab Results   Component Value Date    WBC 4.5 06/04/2024    WBC 6.2 09/15/2020    HGB 14.5 06/04/2024    HGB 14.6 09/15/2020    MCV 93 06/04/2024    MCV 93 09/15/2020    PLT 89 06/04/2024     09/15/2020     Lab Results   Component Value Date    INR 1.07 06/04/2024    INR 1.1 04/25/2023    INR 1.05 11/25/2008     Liver biopsy 2022:  LIVER, NEEDLE BIOPSY:  Steatohepatitis, mildly active, with variable but overall approx. 40% steatosis:   -Advanced fibrosis concerning for established cirrhosis   -No evidence of overlapping autoimmune hepatitis or biliary cholangitis     (See Comment)    US 6-5-24  IMPRESSION:  1.  Sequelae of chronic hepatocellular disease. No suspicious liver  lesion.  2.  LI-RADS US Category US-1 Negative: No US evidence of HCC.          Assessment and Plan:    MASH with advanced fibrosis.  This patient is not obese but does have type 2 diabetes.  She has been on semaglutide, and tries to adhere to a healthy diet and regular exercise.    The optimal treatment of \"lean\" patients MASH and type 2 diabetes has not been established. At this point, I suggest that she continue to work with Dr. Walden in Endocrinology regarding her diabetes and GLP-1 agonist therapy. We will arrange for a liver US every 6 months.         About 23 minutes spent with patient on the day of the visit, " reviewing results, and coordinating care.    This note was created with voice recognition software, and while reviewed for accuracy, typos may remain.        Rogerio Carranza MD  Professor of Medicine  [facility]  Division of Gastroenterology, Hepatology, and Nutrition

## 2024-06-12 DIAGNOSIS — E11.8 TYPE 2 DIABETES MELLITUS WITH UNSPECIFIED COMPLICATIONS (H): ICD-10-CM

## 2024-06-12 DIAGNOSIS — E11.9 TYPE 2 DIABETES MELLITUS WITHOUT COMPLICATIONS (H): ICD-10-CM

## 2024-06-12 DIAGNOSIS — K74.60 LIVER CIRRHOSIS SECONDARY TO NASH (H): Primary | ICD-10-CM

## 2024-06-12 DIAGNOSIS — K75.81 LIVER CIRRHOSIS SECONDARY TO NASH (H): Primary | ICD-10-CM

## 2024-06-15 ENCOUNTER — HEALTH MAINTENANCE LETTER (OUTPATIENT)
Age: 64
End: 2024-06-15

## 2024-07-16 ENCOUNTER — TELEPHONE (OUTPATIENT)
Dept: TRANSPLANT | Facility: CLINIC | Age: 64
End: 2024-07-16

## 2024-11-02 ENCOUNTER — HEALTH MAINTENANCE LETTER (OUTPATIENT)
Age: 64
End: 2024-11-02

## 2024-11-14 ENCOUNTER — TELEPHONE (OUTPATIENT)
Dept: GASTROENTEROLOGY | Facility: CLINIC | Age: 64
End: 2024-11-14

## 2024-11-14 NOTE — TELEPHONE ENCOUNTER
LVM to make follow up appt (April) with Dr Carranza along with lab appt 3-7 days prior. Call back [phone]

## 2024-11-20 DIAGNOSIS — E11.65 TYPE 2 DIABETES MELLITUS WITH HYPERGLYCEMIA, WITH LONG-TERM CURRENT USE OF INSULIN (H): ICD-10-CM

## 2024-11-20 DIAGNOSIS — Z79.4 TYPE 2 DIABETES MELLITUS WITH HYPERGLYCEMIA, WITH LONG-TERM CURRENT USE OF INSULIN (H): ICD-10-CM

## 2024-11-25 RX ORDER — SEMAGLUTIDE 2.68 MG/ML
INJECTION, SOLUTION SUBCUTANEOUS
Qty: 9 ML | Refills: 0 | OUTPATIENT
Start: 2024-11-25

## 2024-11-25 NOTE — TELEPHONE ENCOUNTER
Refill denied at this time. Patient needs appointment with Dr. Walden. Al[employer]rac[employer] message sent to patient to schedule appointment.

## 2024-11-25 NOTE — TELEPHONE ENCOUNTER
Semaglutide, 2 MG/DOSE, (OZEMPIC) 8 MG/3ML pen 9 mL 11 10/2/2023     Last Office Visit: 3/25/24--FOLLOW-UP:  12 weeks.  Future Office visit:   none    Received refill request for semaglutide . Patient needs appointment scheduled prior to any refills. Clinic Coordinator notified and will follow up with the patient as appropriate. The pharmacy has been notified that the medication will not be refilled prior to an appointment being scheduled.    Kathryn Holder RN  [facility]/Red Flag Triage & Med Refill Team

## 2024-11-26 ENCOUNTER — LAB (OUTPATIENT)
Dept: LAB | Facility: CLINIC | Age: 64
End: 2024-11-26
Attending: INTERNAL MEDICINE
Payer: COMMERCIAL

## 2024-11-26 ENCOUNTER — HOSPITAL ENCOUNTER (OUTPATIENT)
Dept: ULTRASOUND IMAGING | Facility: CLINIC | Age: 64
Discharge: HOME | End: 2024-11-26
Attending: INTERNAL MEDICINE
Payer: COMMERCIAL

## 2024-11-26 DIAGNOSIS — E11.9 TYPE 2 DIABETES MELLITUS WITHOUT COMPLICATIONS (H): ICD-10-CM

## 2024-11-26 DIAGNOSIS — K75.81 LIVER CIRRHOSIS SECONDARY TO NASH (H): ICD-10-CM

## 2024-11-26 DIAGNOSIS — E11.8 TYPE 2 DIABETES MELLITUS WITH UNSPECIFIED COMPLICATIONS (H): ICD-10-CM

## 2024-11-26 DIAGNOSIS — K74.60 LIVER CIRRHOSIS SECONDARY TO NASH (H): ICD-10-CM

## 2024-11-26 DIAGNOSIS — Z79.4 TYPE 2 DIABETES MELLITUS WITH HYPERGLYCEMIA, WITH LONG-TERM CURRENT USE OF INSULIN (H): ICD-10-CM

## 2024-11-26 DIAGNOSIS — E11.65 TYPE 2 DIABETES MELLITUS WITH HYPERGLYCEMIA, WITH LONG-TERM CURRENT USE OF INSULIN (H): ICD-10-CM

## 2024-11-26 LAB
ALBUMIN SERPL BCG-MCNC: 4.5 G/DL (ref 3.5–5.2)
ALP SERPL-CCNC: 60 U/L (ref 40–150)
ALT SERPL W P-5'-P-CCNC: 70 U/L (ref 0–50)
ANION GAP SERPL CALCULATED.3IONS-SCNC: 13 MMOL/L (ref 7–15)
AST SERPL W P-5'-P-CCNC: 24 U/L (ref 0–45)
BILIRUB SERPL-MCNC: 0.9 MG/DL
BUN SERPL-MCNC: 17.5 MG/DL (ref 8–23)
CALCIUM SERPL-MCNC: 9.3 MG/DL (ref 8.8–10.4)
CHLORIDE SERPL-SCNC: 102 MMOL/L (ref 98–107)
CREAT SERPL-MCNC: 0.75 MG/DL (ref 0.51–0.95)
EGFRCR SERPLBLD CKD-EPI 2021: 88 ML/MIN/1.73M2
EST. AVERAGE GLUCOSE BLD GHB EST-MCNC: 192 MG/DL
GLUCOSE SERPL-MCNC: 202 MG/DL (ref 70–99)
HBA1C MFR BLD: 8.3 %
HCO3 SERPL-SCNC: 22 MMOL/L (ref 22–29)
POTASSIUM SERPL-SCNC: 4.1 MMOL/L (ref 3.4–5.3)
PROT SERPL-MCNC: 7 G/DL (ref 6.4–8.3)
SODIUM SERPL-SCNC: 137 MMOL/L (ref 135–145)
TSH SERPL DL<=0.005 MIU/L-ACNC: 1.36 UIU/ML (ref 0.3–4.2)

## 2024-11-26 PROCEDURE — 82040 ASSAY OF SERUM ALBUMIN: CPT

## 2024-11-26 PROCEDURE — 76705 ECHO EXAM OF ABDOMEN: CPT

## 2024-11-26 PROCEDURE — 84155 ASSAY OF PROTEIN SERUM: CPT

## 2024-11-26 PROCEDURE — 83036 HEMOGLOBIN GLYCOSYLATED A1C: CPT

## 2024-11-26 PROCEDURE — 36415 COLL VENOUS BLD VENIPUNCTURE: CPT

## 2024-11-26 PROCEDURE — 84443 ASSAY THYROID STIM HORMONE: CPT

## 2024-11-26 NOTE — RESULT ENCOUNTER NOTE
Ms. Shepard:    This ultrasound suggested steatosis, as expected, but no focal liver lesion was seen. I suggest that you continue to get a liver ultrasound about every 6 months.    If you have any questions about your liver disease care or tests, you can call the clinic at [phone].     - Dr. Carranza

## 2024-11-26 NOTE — RESULT ENCOUNTER NOTE
Ms. Shepard:    Your liver tests are stable. However, your hemoglobin A1C is more elevated. I suggest that you contact your diabetes and weight management providers regarding this.    If you have any questions about your liver disease care or tests, you can call the clinic at [phone].     - Dr. Carranza

## 2024-12-03 RX ORDER — SEMAGLUTIDE 2.68 MG/ML
2 INJECTION, SOLUTION SUBCUTANEOUS
Qty: 9 ML | Refills: 1 | Status: SHIPPED | OUTPATIENT
Start: 2024-12-03

## 2024-12-03 NOTE — TELEPHONE ENCOUNTER
Semaglutide, 2 MG/DOSE, (OZEMPIC) 8 MG/3ML pen 9 mL 11 10/2/2023        Last Office Visit : 3/25/24  Future Office visit:  4/28/25    Refilled per protocol

## 2024-12-04 DIAGNOSIS — E11.65 TYPE 2 DIABETES MELLITUS WITH HYPERGLYCEMIA, WITH LONG-TERM CURRENT USE OF INSULIN (H): ICD-10-CM

## 2024-12-04 DIAGNOSIS — Z79.4 TYPE 2 DIABETES MELLITUS WITH HYPERGLYCEMIA, WITH LONG-TERM CURRENT USE OF INSULIN (H): ICD-10-CM

## 2024-12-11 RX ORDER — SEMAGLUTIDE 2.68 MG/ML
INJECTION, SOLUTION SUBCUTANEOUS
Qty: 9 ML | Refills: 0 | OUTPATIENT
Start: 2024-12-11

## 2024-12-11 NOTE — TELEPHONE ENCOUNTER
Semaglutide, 2 MG/DOSE, (OZEMPIC, 2 MG/DOSE,) 8 MG/3ML pen 9 mL 1 12/3/2024       Should have refills on file. Pharmacy sent message. Rx refill denied    Kathryn Holder RN  P Red Flag Triage/MRT

## 2025-01-09 ENCOUNTER — TELEPHONE (OUTPATIENT)
Dept: TRANSPLANT | Facility: CLINIC | Age: 65
End: 2025-01-09
Payer: COMMERCIAL

## 2025-01-09 NOTE — TELEPHONE ENCOUNTER
Pt returned call. Pt states she has had a consult for the hernia surgery already, where she was told it would be extremely dangerous. Pt was very frightened and is no longer interested in a consult for surgery, and asked writer to pass along this message.

## 2025-02-01 ENCOUNTER — HEALTH MAINTENANCE LETTER (OUTPATIENT)
Age: 65
End: 2025-02-01

## 2025-02-05 DIAGNOSIS — Z79.4 TYPE 2 DIABETES MELLITUS WITH HYPERGLYCEMIA, WITH LONG-TERM CURRENT USE OF INSULIN (H): ICD-10-CM

## 2025-02-05 DIAGNOSIS — E11.65 TYPE 2 DIABETES MELLITUS WITH HYPERGLYCEMIA, WITH LONG-TERM CURRENT USE OF INSULIN (H): ICD-10-CM

## 2025-02-10 NOTE — TELEPHONE ENCOUNTER
Last Written Prescription:  blood glucose (NO BRAND SPECIFIED) test strip 100 strip 11 3/25/2024 -- No   Sig: Use to test blood sugar 3 times daily or as directed.     ----------------------  Last Visit Date: 3-25-24  Future Visit Date: 4-28-25  ----------------------      [x]  Refill decision: Medication unable to be refilled by RN due to:     [x]    Pt not seen within past 12 months;  No FOV;  or FOV exceeds timeframe per protocol requirements  []    Compliance - lapse in therapy/gap in refills; No Shows; Cancellations  []    Verification - order discrepancy, clarification needed, Sig modification needed  []    Controlled medication  []    Medication not included in refill protocol policy  []    Abnormal labs/test:  []    Overdue labs/test:    []    Medication not active on Pt's med list  []    Drug interaction Warning  []    Medication - Last script is Reported/Historical/Transitional  []    Advanced refill request  []    Review Needed: New med; Med adjusted within <= 30 days; Safety Alert; Lab monitoring required  []    Other:     Request from pharmacy:  Requested Prescriptions   Pending Prescriptions Disp Refills    ACCU-CHEK GUIDE TEST test strip [Pharmacy Med Name: Accu-Chek Guide In Vitro Strip]  0     Sig: USE TO TEST BLOOD SUGARS THREE TIMES DAILY OR AS DIRECTED       Diabetic Supplies Protocol Passed - 2/10/2025 12:11 PM        Passed - Medication is active on med list        Passed - Recent (12 month) or future (90 days) visit with authorizing provider s specialty     The patient must have completed an in-person or virtual visit within the past 12 months or has a future visit scheduled within the next 90 days with the authorizing provider s specialty.  Urgent care and e-visits do not qualify as an office visit for this protocol.          Passed - Medication indicated for associated diagnosis        Passed - Patient is 18 years of age or older

## 2025-02-17 RX ORDER — BLOOD SUGAR DIAGNOSTIC
STRIP MISCELLANEOUS
Qty: 90 STRIP | Refills: 0 | Status: SHIPPED | OUTPATIENT
Start: 2025-02-17

## 2025-03-01 ENCOUNTER — HEALTH MAINTENANCE LETTER (OUTPATIENT)
Age: 65
End: 2025-03-01

## 2025-04-09 ENCOUNTER — LAB (OUTPATIENT)
Dept: LAB | Facility: CLINIC | Age: 65
End: 2025-04-09
Payer: COMMERCIAL

## 2025-04-09 DIAGNOSIS — K74.60 CIRRHOSIS OF LIVER WITHOUT ASCITES, UNSPECIFIED HEPATIC CIRRHOSIS TYPE (H): ICD-10-CM

## 2025-04-09 LAB
ALBUMIN SERPL BCG-MCNC: 4.6 G/DL (ref 3.5–5.2)
ALP SERPL-CCNC: 66 U/L (ref 40–150)
ALT SERPL W P-5'-P-CCNC: 79 U/L (ref 0–50)
ANION GAP SERPL CALCULATED.3IONS-SCNC: 12 MMOL/L (ref 7–15)
AST SERPL W P-5'-P-CCNC: 34 U/L (ref 0–45)
BILIRUB SERPL-MCNC: 0.7 MG/DL
BUN SERPL-MCNC: 17.3 MG/DL (ref 8–23)
CALCIUM SERPL-MCNC: 9.9 MG/DL (ref 8.8–10.4)
CHLORIDE SERPL-SCNC: 103 MMOL/L (ref 98–107)
CREAT SERPL-MCNC: 0.76 MG/DL (ref 0.51–0.95)
EGFRCR SERPLBLD CKD-EPI 2021: 86 ML/MIN/1.73M2
ERYTHROCYTE [DISTWIDTH] IN BLOOD BY AUTOMATED COUNT: 13.4 % (ref 10–15)
GLUCOSE SERPL-MCNC: 248 MG/DL (ref 70–99)
HCO3 SERPL-SCNC: 23 MMOL/L (ref 22–29)
HCT VFR BLD AUTO: 42.9 % (ref 35–47)
HGB BLD-MCNC: 14.3 G/DL (ref 11.7–15.7)
INR PPP: 1.02 (ref 0.85–1.15)
MCH RBC QN AUTO: 31 PG (ref 26.5–33)
MCHC RBC AUTO-ENTMCNC: 33.3 G/DL (ref 31.5–36.5)
MCV RBC AUTO: 93 FL (ref 78–100)
PLATELET # BLD AUTO: 100 10E3/UL (ref 150–450)
POTASSIUM SERPL-SCNC: 4.1 MMOL/L (ref 3.4–5.3)
PROT SERPL-MCNC: 7.3 G/DL (ref 6.4–8.3)
RBC # BLD AUTO: 4.62 10E6/UL (ref 3.8–5.2)
SODIUM SERPL-SCNC: 138 MMOL/L (ref 135–145)
WBC # BLD AUTO: 6.3 10E3/UL (ref 4–11)

## 2025-04-09 PROCEDURE — 36415 COLL VENOUS BLD VENIPUNCTURE: CPT

## 2025-04-09 PROCEDURE — 85610 PROTHROMBIN TIME: CPT

## 2025-04-09 PROCEDURE — 80053 COMPREHEN METABOLIC PANEL: CPT

## 2025-04-09 PROCEDURE — 85027 COMPLETE CBC AUTOMATED: CPT

## 2025-04-15 ENCOUNTER — OFFICE VISIT (OUTPATIENT)
Dept: GASTROENTEROLOGY | Facility: CLINIC | Age: 65
End: 2025-04-15
Payer: COMMERCIAL

## 2025-04-15 VITALS
HEART RATE: 85 BPM | OXYGEN SATURATION: 97 % | SYSTOLIC BLOOD PRESSURE: 166 MMHG | DIASTOLIC BLOOD PRESSURE: 77 MMHG | RESPIRATION RATE: 20 BRPM

## 2025-04-15 DIAGNOSIS — K74.60 CIRRHOSIS OF LIVER WITHOUT ASCITES, UNSPECIFIED HEPATIC CIRRHOSIS TYPE (H): Primary | ICD-10-CM

## 2025-04-15 PROCEDURE — G2211 COMPLEX E/M VISIT ADD ON: HCPCS | Performed by: INTERNAL MEDICINE

## 2025-04-15 PROCEDURE — 3077F SYST BP >= 140 MM HG: CPT | Performed by: INTERNAL MEDICINE

## 2025-04-15 PROCEDURE — 99213 OFFICE O/P EST LOW 20 MIN: CPT | Performed by: INTERNAL MEDICINE

## 2025-04-15 PROCEDURE — 3078F DIAST BP <80 MM HG: CPT | Performed by: INTERNAL MEDICINE

## 2025-04-15 NOTE — PROGRESS NOTES
[facility]       Hepatology Clinic    Date of Service: 4/15/2025       Primary Care Provider: Willie Walden    History of Present Illness     Ms. Shepard presents for follow-up of [facility] with advanced hepatic fibrosis.  She is accompanied by her .    She reports no recent GI or hepatic symptoms.  She has gained some weight over the past winter, and notes that her blood sugars are higher.  She has a follow-up in endocrinology clinic with Dr. Walden in the near future.    She is not drinking alcohol.      Past Medical History:  Past Medical History:   Diagnosis Date    Bell's palsy     Cancer (H)     Diabetes (H)     Heart disease     Hypertension     WARREN (nonalcoholic steatohepatitis)     PONV (postoperative nausea and vomiting)     Thyroid disease        Patient Active Problem List   Diagnosis    Bell's paralysis    Cirrhosis of liver without ascites (H)    Coronary atherosclerosis    Essential hypertension    Generalized anxiety disorder    Hypothyroidism    Major depressive disorder, recurrent episode, in partial remission    Mixed hyperlipidemia    Psoriatic arthropathy (H)    Type II diabetes mellitus (H)       Surgical History:  Past Surgical History:   Procedure Laterality Date    ABDOMINAL ADHESION SURGERY      APPENDECTOMY      CARDIAC SURGERY       SECTION      COLONOSCOPY N/A 2022    Procedure: COLONOSCOPY;  Surgeon: Rogerio Carranza MD;  Location:  GI    ENT SURGERY      ESOPHAGOSCOPY, GASTROSCOPY, DUODENOSCOPY (EGD), COMBINED N/A 2022    Procedure: ESOPHAGOGASTRODUODENOSCOPY (EGD);  Surgeon: Rogerio Carranza MD;  Location:  GI    HYSTERECTOMY      MAMMOPLASTY AUGMENTATION Bilateral 2016    Procedure: BILATERAL BREAST IMPLANT EXCHANGE;  Surgeon: Ermias Barakat MD;  Location: [facility];  Service:     MASTECTOMY Bilateral     TONSILLECTOMY         Social History:  Social History     Tobacco Use     Smoking status: Never    Smokeless tobacco: Never   Vaping Use    Vaping status: Never Used   Substance Use Topics    Alcohol use: No    Drug use: No       Family History:  Family History   Problem Relation Age of Onset    Breast Cancer Mother     Coronary Artery Disease Father        Medications:  Current Outpatient Medications   Medication Sig Dispense Refill    ACCU-CHEK GUIDE TEST test strip USE TO TEST BLOOD SUGARS THREE TIMES DAILY OR AS DIRECTED 90 strip 0    ammonium lactate (LAC-HYDRIN) 12 % external lotion       Apremilast (OTEZLA) 10 & 20 & 30 MG TBPK       artificial tears (SOOTHE NIGHT TIME) ophthalmic ointment Apply to left eye nightly for 7 days.  May substitute generic. 3.5 g 0    atorvastatin (LIPITOR) 20 MG tablet Take 20 mg by mouth daily      bisacodyl (DULCOLAX) 5 MG EC tablet Take as directed. One day before exam take 2 tablets at 3 PM. Take 2 tablets at 11 PM. 4 tablet 0    calcium carbonate-vitamin D (OS-ABDON WITH D) 500-200 MG-UNIT tablet Take 2 tablets by mouth daily      carboxymethylcellulose (REFRESH PLUS) 0.5 % SOLN ophthalmic solution 1 drop every 3 hours      conjugated estrogens (PREMARIN) 0.625 MG/GM vaginal cream Place 0.5 g vaginally      cyclobenzaprine (FLEXERIL) 10 MG tablet Take 10 mg by mouth      cyclobenzaprine (FLEXERIL) 5 MG tablet Take 5 mg by mouth      hypromellose (GENTEAL) 0.3 % SOLN ophthalmic solution 1 drop      insulin aspart (NOVOLOG PEN) 100 UNIT/ML pen Inject 30 Units Subcutaneous      insulin glargine (LANTUS VIAL) 100 UNIT/ML vial Inject 70 Units Subcutaneous      INVOKANA 300 MG tablet Take 300 mg by mouth daily      JARDIANCE 25 MG TABS tablet Take 25 mg by mouth daily      levothyroxine (SYNTHROID/LEVOTHROID) 100 MCG tablet Take 100 mcg by mouth      LORazepam (ATIVAN) 1 MG tablet Take 1 mg by mouth every 6 hours as needed for anxiety      meclizine (ANTIVERT) 25 MG tablet TAKE 1 TABLET BY MOUTH TWICE DAILY AS NEEDED FOR DIZZINESS      metoprolol  succinate ER (TOPROL XL) 25 MG 24 hr tablet Take 1 tablet by mouth daily      Multiple Vitamin (MULTIVITAMIN ADULT PO)       NONFORMULARY TUDCA supplement      NONFORMULARY Liver support 1 tab daily      ondansetron (ZOFRAN) 4 MG tablet Take 4 mg by mouth      polyethylene glycol (GOLYTELY) 236 g suspension Take as directed. One day before exam fill the jug with water. Cover and shake until well mixed. At 6 PM start drinking an 8oz glass of mixture every 15 minutes until jug is 1/2 empty. Store remainder in the refrigerator.  At 11 PM Start drinking the other half of the Golytely jug. Drink one 8-ounce glass every 15 minutes until the jug is empty. 4000 mL 0    polyethylene glycol-propylene glycol (SYSTANE ULTRA) 0.4-0.3 % SOLN ophthalmic solution 1-2 drops      Semaglutide, 2 MG/DOSE, (OZEMPIC, 2 MG/DOSE,) 8 MG/3ML pen Inject 2 mg subcutaneously every 7 days. 9 mL 1    valACYclovir (VALTREX) 1000 mg tablet Take 1 tablet (1,000 mg) by mouth 3 times daily for 7 days 21 tablet 0    valsartan (DIOVAN) 40 MG tablet Take 1 tablet by mouth daily      zolpidem (AMBIEN) 5 MG tablet Take 5 mg by mouth       No current facility-administered medications for this visit.         Objective:         There were no vitals filed for this visit.  There is no height or weight on file to calculate BMI.     Physical Exam  Constitutional: Well-developed, well-nourished, in no apparent distress.    Psychiatric: Normal mood and affect. Behavior is normal.      Labs and Diagnostic tests:  Lab Results   Component Value Date     04/09/2025     09/15/2020    POTASSIUM 4.1 04/09/2025    POTASSIUM 4.3 08/09/2022    POTASSIUM 3.9 09/15/2020    CHLORIDE 103 04/09/2025    CHLORIDE 103 04/25/2023    CHLORIDE 108 09/15/2020    CO2 23 04/09/2025    CO2 22 08/09/2022    CO2 20 09/15/2020    BUN 17.3 04/09/2025    BUN 15 08/09/2022    BUN 18 09/15/2020    CR 0.76 04/09/2025    CR 0.70 09/15/2020     Lab Results   Component Value Date     "BILITOTAL 0.7 04/09/2025    ALT 79 04/09/2025    AST 34 04/09/2025    ALKPHOS 66 04/09/2025     Lab Results   Component Value Date    ALBUMIN 4.6 04/09/2025    ALBUMIN 4.1 08/09/2022    PROTTOTAL 7.3 04/09/2025      Lab Results   Component Value Date    WBC 6.3 04/09/2025    WBC 6.2 09/15/2020    HGB 14.3 04/09/2025    HGB 14.6 09/15/2020    MCV 93 04/09/2025    MCV 93 09/15/2020     04/09/2025     09/15/2020     Lab Results   Component Value Date    INR 1.02 04/09/2025    INR 1.1 04/25/2023    INR 1.05 11/25/2008     Previous work-up:   Lab Results   Component Value Date    SMOOTHMUS 73 (H) 08/09/2022    MITM2 43.0 (H) 08/09/2022    TSH 1.36 11/26/2024    A1C 8.3 (H) 11/26/2024    POPETH <10 08/09/2022    PLPETH <10 08/09/2022      No results found for: \"SPECDES\", \"LDRESULTS\"     Ultrasound 11/26/2024  IMPRESSION:  1.  Hepatomegaly and fatty liver. No focal hepatic observation.    Liver biopsy 2022:  LIVER, NEEDLE BIOPSY:  Steatohepatitis, mildly active, with variable but overall approx. 40% steatosis:   -Advanced fibrosis concerning for established cirrhosis   -No evidence of overlapping autoimmune hepatitis or biliary cholangitis     (See Comment)    Assessment and Plan:    REGINO with likely compensated cirrhosis.    Overall, she appears to be relatively stable.      For now, I am suggesting that we get liver ultrasound every 6 months (the hepatology nursing staff help arrange this).  I think it is reasonable to have her follow-up in this clinic in 1 year, but she should return sooner if GI or liver issues arise.    We again discussed the fact that weight loss is a primary approach to treatment of REGINO, and I suggest that she continue to work with Dr. Walden regarding diabetes control, GLP-1 agonist dosing, and sustained weight loss. Resmetirom can be beneficial in a minority of patients who take the drug (based on current studies), but is not been shown to be beneficial in patients with " cirrhosis; thus, I am reluctant to prescribe this patient.    Their questions were answered.      25 minutes spent with patient, reviewing results, and coordinating care.  The longitudinal plan of care for the diagnosis(es)/condition(s) as documented were addressed during this visit. Due to the added complexity in care, I will continue to support Nina in the subsequent management and with ongoing continuity of care.     This note was created with voice recognition software, and while reviewed for accuracy, typos may remain.        Rogerio Carranza MD  Professor of Medicine  [facility]  Division of Gastroenterology, Hepatology, and Nutrition

## 2025-04-15 NOTE — LETTER
4/15/2025      Nina Shepard  [address] [place] [address]      Dear Colleague,    Thank you for referring your patient, Nina Shepard, to the [facility] [place]. Please see a copy of my visit note below.    [facility]       Hepatology Clinic    Date of Service: 4/15/2025       Primary Care Provider: Willie Walden    History of Present Illness     Ms. Shepard presents for follow-up of [facility] with advanced hepatic fibrosis.  She is accompanied by her .    She reports no recent GI or hepatic symptoms.  She has gained some weight over the past winter, and notes that her blood sugars are higher.  She has a follow-up in endocrinology clinic with Dr. Walden in the near future.    She is not drinking alcohol.      Past Medical History:  Past Medical History:   Diagnosis Date     Bell's palsy      Cancer (H)      Diabetes (H)      Heart disease      Hypertension      WARREN (nonalcoholic steatohepatitis)      PONV (postoperative nausea and vomiting)      Thyroid disease        Patient Active Problem List   Diagnosis     Bell's paralysis     Cirrhosis of liver without ascites (H)     Coronary atherosclerosis     Essential hypertension     Generalized anxiety disorder     Hypothyroidism     Major depressive disorder, recurrent episode, in partial remission     Mixed hyperlipidemia     Psoriatic arthropathy (H)     Type II diabetes mellitus (H)       Surgical History:  Past Surgical History:   Procedure Laterality Date     ABDOMINAL ADHESION SURGERY       APPENDECTOMY       CARDIAC SURGERY        SECTION       COLONOSCOPY N/A 2022    Procedure: COLONOSCOPY;  Surgeon: Rogerio Carranza MD;  Location:  GI     ENT SURGERY       ESOPHAGOSCOPY, GASTROSCOPY, DUODENOSCOPY (EGD), COMBINED N/A 2022    Procedure: ESOPHAGOGASTRODUODENOSCOPY (EGD);  Surgeon: Rogerio Carranza MD;  Location:  GI      HYSTERECTOMY       MAMMOPLASTY AUGMENTATION Bilateral 12/30/2016    Procedure: BILATERAL BREAST IMPLANT EXCHANGE;  Surgeon: Ermias Barakat MD;  Location: [facility] Main OR;  Service:      MASTECTOMY Bilateral      TONSILLECTOMY         Social History:  Social History     Tobacco Use     Smoking status: Never     Smokeless tobacco: Never   Vaping Use     Vaping status: Never Used   Substance Use Topics     Alcohol use: No     Drug use: No       Family History:  Family History   Problem Relation Age of Onset     Breast Cancer Mother      Coronary Artery Disease Father        Medications:  Current Outpatient Medications   Medication Sig Dispense Refill     ACCU-CHEK GUIDE TEST test strip USE TO TEST BLOOD SUGARS THREE TIMES DAILY OR AS DIRECTED 90 strip 0     ammonium lactate (LAC-HYDRIN) 12 % external lotion        Apremilast (OTEZLA) 10 & 20 & 30 MG TBPK        artificial tears (SOOTHE NIGHT TIME) ophthalmic ointment Apply to left eye nightly for 7 days.  May substitute generic. 3.5 g 0     atorvastatin (LIPITOR) 20 MG tablet Take 20 mg by mouth daily       bisacodyl (DULCOLAX) 5 MG EC tablet Take as directed. One day before exam take 2 tablets at 3 PM. Take 2 tablets at 11 PM. 4 tablet 0     calcium carbonate-vitamin D (OS-ABDON WITH D) 500-200 MG-UNIT tablet Take 2 tablets by mouth daily       carboxymethylcellulose (REFRESH PLUS) 0.5 % SOLN ophthalmic solution 1 drop every 3 hours       conjugated estrogens (PREMARIN) 0.625 MG/GM vaginal cream Place 0.5 g vaginally       cyclobenzaprine (FLEXERIL) 10 MG tablet Take 10 mg by mouth       cyclobenzaprine (FLEXERIL) 5 MG tablet Take 5 mg by mouth       hypromellose (GENTEAL) 0.3 % SOLN ophthalmic solution 1 drop       insulin aspart (NOVOLOG PEN) 100 UNIT/ML pen Inject 30 Units Subcutaneous       insulin glargine (LANTUS VIAL) 100 UNIT/ML vial Inject 70 Units Subcutaneous       INVOKANA 300 MG tablet Take 300 mg by mouth daily       JARDIANCE 25 MG TABS tablet Take 25  mg by mouth daily       levothyroxine (SYNTHROID/LEVOTHROID) 100 MCG tablet Take 100 mcg by mouth       LORazepam (ATIVAN) 1 MG tablet Take 1 mg by mouth every 6 hours as needed for anxiety       meclizine (ANTIVERT) 25 MG tablet TAKE 1 TABLET BY MOUTH TWICE DAILY AS NEEDED FOR DIZZINESS       metoprolol succinate ER (TOPROL XL) 25 MG 24 hr tablet Take 1 tablet by mouth daily       Multiple Vitamin (MULTIVITAMIN ADULT PO)        NONFORMULARY TUDCA supplement       NONFORMULARY Liver support 1 tab daily       ondansetron (ZOFRAN) 4 MG tablet Take 4 mg by mouth       polyethylene glycol (GOLYTELY) 236 g suspension Take as directed. One day before exam fill the jug with water. Cover and shake until well mixed. At 6 PM start drinking an 8oz glass of mixture every 15 minutes until jug is 1/2 empty. Store remainder in the refrigerator.  At 11 PM Start drinking the other half of the Golytely jug. Drink one 8-ounce glass every 15 minutes until the jug is empty. 4000 mL 0     polyethylene glycol-propylene glycol (SYSTANE ULTRA) 0.4-0.3 % SOLN ophthalmic solution 1-2 drops       Semaglutide, 2 MG/DOSE, (OZEMPIC, 2 MG/DOSE,) 8 MG/3ML pen Inject 2 mg subcutaneously every 7 days. 9 mL 1     valACYclovir (VALTREX) 1000 mg tablet Take 1 tablet (1,000 mg) by mouth 3 times daily for 7 days 21 tablet 0     valsartan (DIOVAN) 40 MG tablet Take 1 tablet by mouth daily       zolpidem (AMBIEN) 5 MG tablet Take 5 mg by mouth       No current facility-administered medications for this visit.         Objective:         There were no vitals filed for this visit.  There is no height or weight on file to calculate BMI.     Physical Exam  Constitutional: Well-developed, well-nourished, in no apparent distress.    Psychiatric: Normal mood and affect. Behavior is normal.      Labs and Diagnostic tests:  Lab Results   Component Value Date     04/09/2025     09/15/2020    POTASSIUM 4.1 04/09/2025    POTASSIUM 4.3 08/09/2022    POTASSIUM  "3.9 09/15/2020    CHLORIDE 103 04/09/2025    CHLORIDE 103 04/25/2023    CHLORIDE 108 09/15/2020    CO2 23 04/09/2025    CO2 22 08/09/2022    CO2 20 09/15/2020    BUN 17.3 04/09/2025    BUN 15 08/09/2022    BUN 18 09/15/2020    CR 0.76 04/09/2025    CR 0.70 09/15/2020     Lab Results   Component Value Date    BILITOTAL 0.7 04/09/2025    ALT 79 04/09/2025    AST 34 04/09/2025    ALKPHOS 66 04/09/2025     Lab Results   Component Value Date    ALBUMIN 4.6 04/09/2025    ALBUMIN 4.1 08/09/2022    PROTTOTAL 7.3 04/09/2025      Lab Results   Component Value Date    WBC 6.3 04/09/2025    WBC 6.2 09/15/2020    HGB 14.3 04/09/2025    HGB 14.6 09/15/2020    MCV 93 04/09/2025    MCV 93 09/15/2020     04/09/2025     09/15/2020     Lab Results   Component Value Date    INR 1.02 04/09/2025    INR 1.1 04/25/2023    INR 1.05 11/25/2008     Previous work-up:   Lab Results   Component Value Date    SMOOTHMUS 73 (H) 08/09/2022    MITM2 43.0 (H) 08/09/2022    TSH 1.36 11/26/2024    A1C 8.3 (H) 11/26/2024    POPETH <10 08/09/2022    PLPETH <10 08/09/2022      No results found for: \"SPECDES\", \"LDRESULTS\"     Ultrasound 11/26/2024  IMPRESSION:  1.  Hepatomegaly and fatty liver. No focal hepatic observation.    Liver biopsy 2022:  LIVER, NEEDLE BIOPSY:  Steatohepatitis, mildly active, with variable but overall approx. 40% steatosis:   -Advanced fibrosis concerning for established cirrhosis   -No evidence of overlapping autoimmune hepatitis or biliary cholangitis     (See Comment)    Assessment and Plan:    REGINO with likely compensated cirrhosis.    Overall, she appears to be relatively stable.      For now, I am suggesting that we get liver ultrasound every 6 months (the hepatology nursing staff help arrange this).  I think it is reasonable to have her follow-up in this clinic in 1 year, but she should return sooner if GI or liver issues arise.    We again discussed the fact that weight loss is a primary approach to treatment " chan [facility], and I suggest that she continue to work with Dr. Walden regarding diabetes control, GLP-1 agonist dosing, and sustained weight loss. Resmetirom can be beneficial in a minority of patients who take the drug (based on current studies), but is not been shown to be beneficial in patients with cirrhosis; thus, I am reluctant to prescribe this patient.    Their questions were answered.      25 minutes spent with patient, reviewing results, and coordinating care.  The longitudinal plan of care for the diagnosis(es)/condition(s) as documented were addressed during this visit. Due to the added complexity in care, I will continue to support Nina in the subsequent management and with ongoing continuity of care.     This note was created with voice recognition software, and while reviewed for accuracy, typos may remain.        Rogerio Carranza MD  Professor of Medicine  [facility]  Division of Gastroenterology, Hepatology, and Nutrition       Again, thank you for allowing me to participate in the care of your patient.        Sincerely,        Rogerio Carranza MD    Electronically signed

## 2025-04-15 NOTE — PATIENT INSTRUCTIONS
Summary:    1.  I suggest that you continue to work with Dr. Walden regarding diabetes control, dosing of your GLP-1 agonist, and working toward sustained weight loss.  This is the preferred approach to steatotic liver disease.    2.  As we discussed, I suggest you get an ultrasound of your liver every 6 months.  I will ask my nursing staff to help arrange this.    3.  For now, I suggest that we continue to see you in this clinic once a year.  However, please contact us sooner than that if new liver or GI issues arise.    If you have any questions about your liver disease care or tests, you can call the clinic at [phone].

## 2025-04-16 DIAGNOSIS — K74.60 CIRRHOSIS OF LIVER WITHOUT ASCITES, UNSPECIFIED HEPATIC CIRRHOSIS TYPE (H): Primary | ICD-10-CM

## 2025-04-25 DIAGNOSIS — Z79.4 TYPE 2 DIABETES MELLITUS WITH HYPERGLYCEMIA, WITH LONG-TERM CURRENT USE OF INSULIN (H): ICD-10-CM

## 2025-04-25 DIAGNOSIS — E11.65 TYPE 2 DIABETES MELLITUS WITH HYPERGLYCEMIA, WITH LONG-TERM CURRENT USE OF INSULIN (H): ICD-10-CM

## 2025-04-28 ENCOUNTER — TELEPHONE (OUTPATIENT)
Dept: ENDOCRINOLOGY | Facility: CLINIC | Age: 65
End: 2025-04-28

## 2025-04-28 ENCOUNTER — VIRTUAL VISIT (OUTPATIENT)
Dept: ENDOCRINOLOGY | Facility: CLINIC | Age: 65
End: 2025-04-28
Payer: COMMERCIAL

## 2025-04-28 VITALS — HEIGHT: 66 IN | BODY MASS INDEX: 23.78 KG/M2 | WEIGHT: 148 LBS

## 2025-04-28 DIAGNOSIS — E11.65 TYPE 2 DIABETES MELLITUS WITH HYPERGLYCEMIA, WITH LONG-TERM CURRENT USE OF INSULIN (H): Primary | ICD-10-CM

## 2025-04-28 DIAGNOSIS — Z79.4 TYPE 2 DIABETES MELLITUS WITH HYPERGLYCEMIA, WITH LONG-TERM CURRENT USE OF INSULIN (H): Primary | ICD-10-CM

## 2025-04-28 PROCEDURE — 1125F AMNT PAIN NOTED PAIN PRSNT: CPT | Mod: 93 | Performed by: INTERNAL MEDICINE

## 2025-04-28 PROCEDURE — 98012 SYNCH AUDIO-ONLY EST SF 10: CPT | Performed by: INTERNAL MEDICINE

## 2025-04-28 RX ORDER — CANAGLIFLOZIN 300 MG/1
300 TABLET, FILM COATED ORAL DAILY
Qty: 90 TABLET | Refills: 5 | Status: SHIPPED | OUTPATIENT
Start: 2025-04-28

## 2025-04-28 RX ORDER — SEMAGLUTIDE 2.68 MG/ML
INJECTION, SOLUTION SUBCUTANEOUS
Qty: 9 ML | Refills: 0 | OUTPATIENT
Start: 2025-04-28

## 2025-04-28 RX ORDER — INSULIN GLARGINE 100 [IU]/ML
70 INJECTION, SOLUTION SUBCUTANEOUS 2 TIMES DAILY
Qty: 60 ML | Refills: 11 | Status: SHIPPED | OUTPATIENT
Start: 2025-04-28 | End: 2025-04-28 | Stop reason: ALTCHOICE

## 2025-04-28 RX ORDER — INSULIN GLARGINE 100 [IU]/ML
70 INJECTION, SOLUTION SUBCUTANEOUS 2 TIMES DAILY
Qty: 15 ML | Refills: 11 | Status: SHIPPED | OUTPATIENT
Start: 2025-04-28

## 2025-04-28 RX ORDER — HYDROCHLOROTHIAZIDE 12.5 MG/1
CAPSULE ORAL
Qty: 6 EACH | Refills: 11 | Status: SHIPPED | OUTPATIENT
Start: 2025-04-28

## 2025-04-28 ASSESSMENT — PAIN SCALES - GENERAL: PAINLEVEL_OUTOF10: MODERATE PAIN (6)

## 2025-04-28 NOTE — PROGRESS NOTES
Virtual Visit Details    Type of service:  Telephone Visit   Phone call duration: 15 minutes   Originating Location (pt. Location): Home    Distant Location (provider location):  Off-site  Telephone visit completed due to the patient did not consent to a video visit.

## 2025-04-28 NOTE — TELEPHONE ENCOUNTER
Patient saw Dr. Walden today for clinic visit. Ozempic was stopped and Mounjaro was started.     Plan:  Try switching to tirzepatide 10 from semaglutide 2 mg/w for hoped benefit on insulin dosing and liver fat. Needs to switch jardiance to invokanna as jardiance ineffective.

## 2025-04-28 NOTE — NURSING NOTE
Current patient location: [address] [place] [address]    Is the patient currently in the state of [place]? YES    Visit mode: TELEPHONE    If the visit is dropped, the patient can be reconnected by:TELEPHONE VISIT: Phone number:   Telephone Information:   Mobile [phone]       Will anyone else be joining the visit? NO  (If patient encounters technical issues they should call [phone]    Are changes needed to the allergy or medication list? No    Are refills needed on medications prescribed by this physician? Discuss with provider    Rooming Documentation:  Questionnaire(s) completed    Reason for visit: RECHECK    Pt states 6/10 abdominal pain where liver is located-chronic.    Jalen CHENF

## 2025-04-28 NOTE — TELEPHONE ENCOUNTER
Last Written Prescription:  Semaglutide, 2 MG/DOSE, (OZEMPIC, 2 MG/DOSE,) 8 MG/3ML pen 9 mL 1 12/3/2024     ----------------------  Last Visit Date: 3/25/24  Future Visit Date: 4/28/25  ----------------------  Creatinine   Date Value Ref Range Status   04/09/2025 0.76 0.51 - 0.95 mg/dL Final   09/15/2020 0.70 0.52 - 1.04 mg/dL Final         Refill decision: Medication refilled per  Medication Refill in Ambulatory Care  policy.   []  If no future appointment scheduled: Route to Clinic Coordinators to contact the pt for appointment.        Request from pharmacy:  Requested Prescriptions   Pending Prescriptions Disp Refills    OZEMPIC (2 MG/DOSE) 8 MG/3ML pen [Pharmacy Med Name: Ozempic (2 MG/DOSE) 8 MG/3ML Subcutaneous Solution Pen-injector] 9 mL 0     Sig: INJECT 2MG SUBCUTANEOUSLY EVERY 7 DAYS       GLP-1 Agonists Protocol Failed - 4/28/2025 11:19 AM        Failed - HgbA1C in past 3 or 6 months     If HgbA1C is 8 or greater, it needs to be on file within the past 3 months.  If less than 8, must be on file within the past 6 months.     Recent Labs   Lab Test 11/26/24  1205   A1C 8.3*             Failed - Medication is active on med list and the sig matches. RN to manually verify dose and sig if red X/fail.     If the protocol passes (green check), you do not need to verify med dose and sig.    A prescription matches if they are the same clinical intention.    For Example: once daily and every morning are the same.    The protocol can not identify upper and lower case letters as matching and will fail.     For Example: Take 1 tablet (50 mg) by mouth daily     TAKE 1 TABLET (50 MG) BY MOUTH DAILY    For all fails (red x), verify dose and sig.    If the refill does match what is on file, the RN can still proceed to approve the refill request.       If they do not match, route to the appropriate provider.             Failed - Recent (6 mo) or future (90 days) visit within the authorizing provider's specialty     The  patient must have completed an in-person or virtual visit within the past 6 months or has a future visit scheduled within the next 90 days with the authorizing provider s specialty.  Urgent care and e-visits do not quality as an office visit for this protocol.          Passed - Has GFR on file in past 12 months and most recent value is normal        Passed - Medication indicated for associated diagnosis     Medication is associated with one or more of the following diagnoses:     Type 2 diabetes mellitus           Passed - Patient is age 18 or older        Passed - No active pregnancy on record        Passed - No positive pregnancy test in past 12 months

## 2025-04-28 NOTE — PROGRESS NOTES
"    Return Medical Weight Management Note     Nina Shepard  MRN:  [id number]  :  1960  TOMMY:  25    Dear [facility],    I had the pleasure of seeing your patient Nina Shepard.  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  Vitals:  B/P: 118/77, P: 74    Initial consult weight was 158 on 10/17/22.  Weight change since last seen on 24 is down 4 pounds.   Total loss is 10 pounds.    INTERVAL HISTORY:  Semaglutide now tolerating and helps make better choice and now not night eating. A1c very good and liver apparently stable. Found to have adequate c-peptide (7) and BERONICA ab negative. Taking lantus 70 BID, lispro 40 TID and jardiance 25. Says jardiance did not work but invokanna did help her. Wants us to manage DM2 due to primary correction.         No data to display                MEDICATIONS:   Current Outpatient Medications   Medication Sig Dispense Refill    ACCU-CHEK GUIDE TEST test strip USE TO TEST BLOOD SUGARS THREE TIMES DAILY OR AS DIRECTED 90 strip 0    ammonium lactate (LAC-HYDRIN) 12 % external lotion       Apremilast (OTEZLA) 10 & 20 & 30 MG TBPK       artificial tears (SOOTHE NIGHT TIME) ophthalmic ointment Apply to left eye nightly for 7 days.  May substitute generic. 3.5 g 0    atorvastatin (LIPITOR) 20 MG tablet " [FreeTextEntry1] : Ms. REAL is a 23 year old woman s/p robotic cholecystectomy, doing well Take 20 mg by mouth daily      bisacodyl (DULCOLAX) 5 MG EC tablet Take as directed. One day before exam take 2 tablets at 3 PM. Take 2 tablets at 11 PM. 4 tablet 0    calcium carbonate-vitamin D (OS-ABDON WITH D) 500-200 MG-UNIT tablet Take 2 tablets by mouth daily      carboxymethylcellulose (REFRESH PLUS) 0.5 % SOLN ophthalmic solution 1 drop every 3 hours      conjugated estrogens (PREMARIN) 0.625 MG/GM vaginal cream Place 0.5 g vaginally      cyclobenzaprine (FLEXERIL) 10 MG tablet Take 10 mg by mouth      cyclobenzaprine (FLEXERIL) 5 MG tablet Take 5 mg by mouth      hypromellose (GENTEAL) 0.3 % SOLN ophthalmic solution 1 drop      insulin aspart (NOVOLOG PEN) 100 UNIT/ML pen Inject 30 Units Subcutaneous      insulin glargine (LANTUS VIAL) 100 UNIT/ML vial Inject 70 Units Subcutaneous      INVOKANA 300 MG tablet Take 300 mg by mouth daily      JARDIANCE 25 MG TABS tablet Take 25 mg by mouth daily      levothyroxine (SYNTHROID/LEVOTHROID) 100 MCG tablet Take 100 mcg by mouth      LORazepam (ATIVAN) 1 MG tablet Take 1 mg by mouth every 6 hours as needed for anxiety      meclizine (ANTIVERT) 25 MG tablet TAKE 1 TABLET BY MOUTH TWICE DAILY AS NEEDED FOR DIZZINESS      metoprolol succinate ER (TOPROL XL) 25 MG 24 hr tablet Take 1 tablet by mouth daily      Multiple Vitamin (MULTIVITAMIN ADULT PO)       NONFORMULARY TUDCA supplement      NONFORMULARY Liver support 1 tab daily      ondansetron (ZOFRAN) 4 MG tablet Take 4 mg by mouth      polyethylene glycol (GOLYTELY) 236 g suspension Take as directed. One day before exam fill the jug with water. Cover and shake until well mixed. At 6 PM start drinking an 8oz glass of mixture every 15 minutes until jug is 1/2 empty. Store remainder in the refrigerator.  At 11 PM Start drinking the other half of the Golytely jug. Drink one 8-ounce glass every 15 minutes until the jug is empty. 4000 mL 0    polyethylene glycol-propylene glycol (SYSTANE ULTRA) 0.4-0.3 % SOLN ophthalmic  solution 1-2 drops      Semaglutide, 2 MG/DOSE, (OZEMPIC, 2 MG/DOSE,) 8 MG/3ML pen Inject 2 mg subcutaneously every 7 days. 9 mL 1    valACYclovir (VALTREX) 1000 mg tablet Take 1 tablet (1,000 mg) by mouth 3 times daily for 7 days 21 tablet 0    valsartan (DIOVAN) 40 MG tablet Take 1 tablet by mouth daily      zolpidem (AMBIEN) 5 MG tablet Take 5 mg by mouth       No current facility-administered medications for this visit.         4/28/2025    10:48 AM   Weight Loss Medication History Reviewed With Patient   Which weight loss medications are you currently taking on a regular basis? Ozempic   Are you having any side effects from the weight loss medication that we have prescribed you? No      ASSESSMENT:   Try switching to tirzepatide 10 from semaglutide 2 mg/w for hoped benefit on insulin dosing and liver fat. Needs to switch jardiance to invokanna as jardiance ineffective.     FOLLOW-UP:    12 weeks.    Sincerely,  Willie Walden MD

## 2025-04-28 NOTE — TELEPHONE ENCOUNTER
Clinical Pharmacy Note    Lantus Solostar pen sent in place of lantus vials per patient request after call to clinic from [facility]. CPA with Dr. Walden used today and routed to provider.    Jackie Logan, Pharm D., MPH    Medication Therapy Management Pharmacist   [facility]

## 2025-04-28 NOTE — LETTER
"2025       RE: Nina Shepard  [address]  [place] [address]     Dear Colleague,    Thank you for referring your patient, Nina Shepard, to the [facility] [place] [facility]. Please see a copy of my visit note below.    Virtual Visit Details    Type of service:  Telephone Visit   Phone call duration: 15 minutes   Originating Location (pt. Location): Home  {PROVIDER LOCATION On-site should be selected for visits conducted from your clinic location or adjoining [facility] hospital, academic office, or other nearby [facility] building. Off-site should be selected for all other provider locations, including home:[id number]}  Distant Location (provider location):  Off-site  Telephone visit completed due to the patient did not consent to a video visit.        Return Medical Weight Management Note     Nina Shepard  MRN:  [id number]  :  1960  TOMMY:  25    Dear [facility],    I had the pleasure of seeing your patient Nina Shepard.  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  Vitals:  B/P: 118/77, P: 74    Initial consult weight was 158 on 10/17/22.  Weight change since last seen on 24 is down 4 pounds.   Total loss is 10 pounds.    INTERVAL " HISTORY:  Semaglutide now tolerating and helps make better choice and now not night eating. A1c very good and liver apparently stable. Found to have adequate c-peptide (7) and BERONICA ab negative. Taking lantus 70 BID, lispro 40 TID and jardiance 25. Says jardiance did not work but invokanna did help her. Wants us to manage DM2 due to primary snf.         No data to display                MEDICATIONS:   Current Outpatient Medications   Medication Sig Dispense Refill     ACCU-CHEK GUIDE TEST test strip USE TO TEST BLOOD SUGARS THREE TIMES DAILY OR AS DIRECTED 90 strip 0     ammonium lactate (LAC-HYDRIN) 12 % external lotion        Apremilast (OTEZLA) 10 & 20 & 30 MG TBPK        artificial tears (SOOTHE NIGHT TIME) ophthalmic ointment Apply to left eye nightly for 7 days.  May substitute generic. 3.5 g 0     atorvastatin (LIPITOR) 20 MG tablet Take 20 mg by mouth daily       bisacodyl (DULCOLAX) 5 MG EC tablet Take as directed. One day before exam take 2 tablets at 3 PM. Take 2 tablets at 11 PM. 4 tablet 0     calcium carbonate-vitamin D (OS-ABDON WITH D) 500-200 MG-UNIT tablet Take 2 tablets by mouth daily       carboxymethylcellulose (REFRESH PLUS) 0.5 % SOLN ophthalmic solution 1 drop every 3 hours       conjugated estrogens (PREMARIN) 0.625 MG/GM vaginal cream Place 0.5 g vaginally       cyclobenzaprine (FLEXERIL) 10 MG tablet Take 10 mg by mouth       cyclobenzaprine (FLEXERIL) 5 MG tablet Take 5 mg by mouth       hypromellose (GENTEAL) 0.3 % SOLN ophthalmic solution 1 drop       insulin aspart (NOVOLOG PEN) 100 UNIT/ML pen Inject 30 Units Subcutaneous       insulin glargine (LANTUS VIAL) 100 UNIT/ML vial Inject 70 Units Subcutaneous       INVOKANA 300 MG tablet Take 300 mg by mouth daily       JARDIANCE 25 MG TABS tablet Take 25 mg by mouth daily       levothyroxine (SYNTHROID/LEVOTHROID) 100 MCG tablet Take 100 mcg by mouth       LORazepam (ATIVAN) 1 MG tablet Take 1 mg by mouth every 6 hours as needed for  anxiety       meclizine (ANTIVERT) 25 MG tablet TAKE 1 TABLET BY MOUTH TWICE DAILY AS NEEDED FOR DIZZINESS       metoprolol succinate ER (TOPROL XL) 25 MG 24 hr tablet Take 1 tablet by mouth daily       Multiple Vitamin (MULTIVITAMIN ADULT PO)        NONFORMULARY TUDCA supplement       NONFORMULARY Liver support 1 tab daily       ondansetron (ZOFRAN) 4 MG tablet Take 4 mg by mouth       polyethylene glycol (GOLYTELY) 236 g suspension Take as directed. One day before exam fill the jug with water. Cover and shake until well mixed. At 6 PM start drinking an 8oz glass of mixture every 15 minutes until jug is 1/2 empty. Store remainder in the refrigerator.  At 11 PM Start drinking the other half of the Golytely jug. Drink one 8-ounce glass every 15 minutes until the jug is empty. 4000 mL 0     polyethylene glycol-propylene glycol (SYSTANE ULTRA) 0.4-0.3 % SOLN ophthalmic solution 1-2 drops       Semaglutide, 2 MG/DOSE, (OZEMPIC, 2 MG/DOSE,) 8 MG/3ML pen Inject 2 mg subcutaneously every 7 days. 9 mL 1     valACYclovir (VALTREX) 1000 mg tablet Take 1 tablet (1,000 mg) by mouth 3 times daily for 7 days 21 tablet 0     valsartan (DIOVAN) 40 MG tablet Take 1 tablet by mouth daily       zolpidem (AMBIEN) 5 MG tablet Take 5 mg by mouth       No current facility-administered medications for this visit.         4/28/2025    10:48 AM   Weight Loss Medication History Reviewed With Patient   Which weight loss medications are you currently taking on a regular basis? Ozempic   Are you having any side effects from the weight loss medication that we have prescribed you? No      ASSESSMENT:   Try switching to tirzepatide 10 from semaglutide 2 mg/w for hoped benefit on insulin dosing and liver fat. Needs to switch jardiance to invokanna as jardiance ineffective.     FOLLOW-UP:    12 weeks.    Sincerely,  Willie Walden MD      Again, thank you for allowing me to participate in the care of your patient.      Sincerely,    Willie  Nico Walden MD

## 2025-04-29 ENCOUNTER — TELEPHONE (OUTPATIENT)
Dept: ENDOCRINOLOGY | Facility: CLINIC | Age: 65
End: 2025-04-29
Payer: COMMERCIAL

## 2025-05-01 ENCOUNTER — TELEPHONE (OUTPATIENT)
Dept: ENDOCRINOLOGY | Facility: CLINIC | Age: 65
End: 2025-05-01
Payer: COMMERCIAL

## 2025-05-01 NOTE — TELEPHONE ENCOUNTER
Left Voicemail (1st Attempt) and Sent Ac[employer] (1st Attempt) for the patient to call back and schedule the following:    Appointment type: Return Weight Management  Appointment mode: In Person or Virtual Visit  Provider: Dr. Willie Walden  Return date: Approx. 10/28/25  Specialty phone number: [phone]    Additional Notes:

## 2025-06-03 ENCOUNTER — TELEPHONE (OUTPATIENT)
Dept: ENDOCRINOLOGY | Facility: CLINIC | Age: 65
End: 2025-06-03
Payer: COMMERCIAL

## 2025-06-03 DIAGNOSIS — E11.65 TYPE 2 DIABETES MELLITUS WITH HYPERGLYCEMIA, WITH LONG-TERM CURRENT USE OF INSULIN (H): Primary | ICD-10-CM

## 2025-06-03 DIAGNOSIS — Z79.4 TYPE 2 DIABETES MELLITUS WITH HYPERGLYCEMIA, WITH LONG-TERM CURRENT USE OF INSULIN (H): Primary | ICD-10-CM

## 2025-06-03 NOTE — TELEPHONE ENCOUNTER
Prior Authorization Retail Medication Request    Medication/Dose: Invokana 300mg  Diagnosis and ICD code (if different than what is on RX):    Type 2 diabetes mellitus with hyperglycemia, with long-term current use of insulin (H) [E11.65, Z79.4]  - Primary       New/renewal/insurance change PA/secondary ins. PA:  Previously Tried and Failed:  history of diet and exercise, semaglutide, jardiance  Rationale:  I had the pleasure of seeing your patient Nina Shepard. 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    Semaglutide now tolerating and helps make better choice and now not night eating. A1c very good and liver apparently stable. Found to have adequate c-peptide (7) and BERONICA ab negative. Taking lantus 70 BID, lispro 40 TID and jardiance 25. Says jardiance did not work but invokanna did help her. Wants us to manage DM2 due to primary shelter.     Insurance   Primary:   Insurance ID:      Secondary (if applicable):  Insurance ID:      Pharmacy Information (if different than what is on RX)  Name:    [facility] [address] [place] [address] AVENUE     Phone:  [phone]   Fax: [phone]     Clinic Information  Preferred routing pool for dept communication: WLS nurse

## 2025-06-03 NOTE — TELEPHONE ENCOUNTER
PRIOR AUTHORIZATION DENIED    Medication: INVOKANA 300 MG PO TABS  Insurance Company: [employer] Part D - Phone [phone] Fax [phone]  Denial Date: 6/3/2025  Denial Reason(s): PATIENT MUST TRY AND FAIL FARXIGA  Appeal Information:     Patient Notified: NO

## 2025-06-03 NOTE — TELEPHONE ENCOUNTER
Retail Pharmacy Prior Authorization Team   Phone: [phone]    PA Initiation    Medication: INVOKANA 300 MG PO TABS  Insurance Company: [employer] Part D - Phone [phone] Fax [phone]  Pharmacy Filling the Rx: [facility] [address] [place] [address] AVENUE  Filling Pharmacy Phone: [phone]  Filling Pharmacy Fax: [phone]  Start Date: 6/3/2025

## 2025-06-03 NOTE — TELEPHONE ENCOUNTER
General Call      Reason for Call:  Prior Auth    What are your questions or concerns:  [facility] calling to request a prior auth for the patients medication. Fax # is [phone]  Invokana  INVOKANA 300 MG tablet    Date of last appointment with provider: 4/28/25    Could we send this information to you in [place] or would you prefer to receive a phone call?:   Patient would prefer a phone call   Okay to leave a detailed message?: N/A at Other phone number:  [phone]*

## 2025-06-11 ENCOUNTER — RESULTS FOLLOW-UP (OUTPATIENT)
Dept: GASTROENTEROLOGY | Facility: CLINIC | Age: 65
End: 2025-06-11

## 2025-06-11 DIAGNOSIS — K74.60 CIRRHOSIS OF LIVER WITHOUT ASCITES (H): Primary | ICD-10-CM

## 2025-06-15 ENCOUNTER — HEALTH MAINTENANCE LETTER (OUTPATIENT)
Age: 65
End: 2025-06-15

## 2025-06-16 ENCOUNTER — TELEPHONE (OUTPATIENT)
Dept: GASTROENTEROLOGY | Facility: CLINIC | Age: 65
End: 2025-06-16
Payer: COMMERCIAL

## 2025-06-16 RX ORDER — DAPAGLIFLOZIN 10 MG/1
10 TABLET, FILM COATED ORAL DAILY
Qty: 90 TABLET | Refills: 1 | Status: SHIPPED | OUTPATIENT
Start: 2025-06-16

## 2025-06-16 NOTE — TELEPHONE ENCOUNTER
[facility] Call Center    Phone Message    May a detailed message be left on voicemail: yes     Reason for Call: Other: Pt has questions about her recent ultrasound that she would like more explanation about. Please give a call back to discuss.      Action Taken: Message routed to:  [facility] Hep    Travel Screening: Not Applicable     Date of Service:

## 2025-06-16 NOTE — TELEPHONE ENCOUNTER
Returned call to pt and reviewed US results with her.    Pt verbalized understanding and and is agreeable with plan.

## 2025-07-10 DIAGNOSIS — Z79.4 TYPE 2 DIABETES MELLITUS WITH HYPERGLYCEMIA, WITH LONG-TERM CURRENT USE OF INSULIN (H): ICD-10-CM

## 2025-07-10 DIAGNOSIS — E11.65 TYPE 2 DIABETES MELLITUS WITH HYPERGLYCEMIA, WITH LONG-TERM CURRENT USE OF INSULIN (H): ICD-10-CM

## 2025-07-14 RX ORDER — BLOOD SUGAR DIAGNOSTIC
STRIP MISCELLANEOUS
Qty: 90 STRIP | Refills: 0 | Status: SHIPPED | OUTPATIENT
Start: 2025-07-14